# Patient Record
Sex: FEMALE | Race: WHITE | Employment: OTHER | URBAN - METROPOLITAN AREA
[De-identification: names, ages, dates, MRNs, and addresses within clinical notes are randomized per-mention and may not be internally consistent; named-entity substitution may affect disease eponyms.]

---

## 2017-04-19 ENCOUNTER — HOSPITAL ENCOUNTER (OUTPATIENT)
Dept: GENERAL RADIOLOGY | Facility: HOSPITAL | Age: 47
Discharge: HOME | End: 2017-04-19
Attending: INTERNAL MEDICINE
Payer: COMMERCIAL

## 2017-04-19 ENCOUNTER — HOSPITAL ENCOUNTER (OUTPATIENT)
Dept: CT IMAGING | Facility: HOSPITAL | Age: 47
Discharge: HOME | End: 2017-04-19
Attending: INTERNAL MEDICINE
Payer: COMMERCIAL

## 2017-04-19 DIAGNOSIS — M54.50 ACUTE MIDLINE LOW BACK PAIN WITHOUT SCIATICA: ICD-10-CM

## 2017-04-19 DIAGNOSIS — R10.30 LOWER ABDOMINAL PAIN: ICD-10-CM

## 2017-04-19 PROCEDURE — 74176 CT ABD & PELVIS W/O CONTRAST: CPT

## 2017-04-19 PROCEDURE — 72100 X-RAY EXAM L-S SPINE 2/3 VWS: CPT

## 2017-05-30 PROBLEM — R10.9 FLANK PAIN: Status: ACTIVE | Noted: 2017-05-30

## 2017-06-15 ENCOUNTER — HOSPITAL ENCOUNTER (OUTPATIENT)
Dept: MRI IMAGING | Facility: HOSPITAL | Age: 47
Discharge: HOME | End: 2017-06-15
Attending: ORTHOPAEDIC SURGERY
Payer: COMMERCIAL

## 2017-06-15 DIAGNOSIS — R10.9 FLANK PAIN: ICD-10-CM

## 2017-06-15 PROCEDURE — 72146 MRI CHEST SPINE W/O DYE: CPT | Performed by: ORTHOPAEDIC SURGERY

## 2017-06-16 ENCOUNTER — APPOINTMENT (OUTPATIENT)
Dept: NUCLEAR MEDICINE | Facility: HOSPITAL | Age: 47
End: 2017-06-16
Attending: ORTHOPAEDIC SURGERY
Payer: COMMERCIAL

## 2017-06-21 ENCOUNTER — HOSPITAL ENCOUNTER (OUTPATIENT)
Dept: NUCLEAR MEDICINE | Facility: HOSPITAL | Age: 47
Discharge: HOME | End: 2017-06-21
Attending: ORTHOPAEDIC SURGERY
Payer: COMMERCIAL

## 2017-06-21 DIAGNOSIS — R10.9 FLANK PAIN: ICD-10-CM

## 2017-06-21 PROCEDURE — 78315 BONE IMAGING 3 PHASE: CPT | Performed by: ORTHOPAEDIC SURGERY

## 2017-06-26 PROBLEM — M54.6 THORACIC SPINE PAIN: Status: ACTIVE | Noted: 2017-06-26

## 2017-06-27 PROBLEM — R07.81 RIB PAIN ON LEFT SIDE: Status: ACTIVE | Noted: 2017-06-27

## 2017-08-01 PROBLEM — Z53.29 NO-SHOW FOR APPOINTMENT: Status: ACTIVE | Noted: 2017-08-01

## 2017-08-01 PROBLEM — Z91.199 NO-SHOW FOR APPOINTMENT: Status: ACTIVE | Noted: 2017-08-01

## 2018-04-03 PROBLEM — M75.42 IMPINGEMENT SYNDROME OF LEFT SHOULDER: Status: ACTIVE | Noted: 2018-04-03

## 2018-04-03 PROBLEM — M19.019 ARTHRITIS OF SHOULDER REGION: Status: ACTIVE | Noted: 2018-04-03

## 2018-04-03 PROBLEM — M25.512 ACUTE PAIN OF LEFT SHOULDER: Status: ACTIVE | Noted: 2018-04-03

## 2018-04-03 PROCEDURE — 86618 LYME DISEASE ANTIBODY: CPT | Performed by: OTHER

## 2018-04-03 PROCEDURE — 82746 ASSAY OF FOLIC ACID SERUM: CPT | Performed by: OTHER

## 2018-04-03 PROCEDURE — 82607 VITAMIN B-12: CPT | Performed by: OTHER

## 2018-05-03 PROBLEM — M19.012 PRIMARY OSTEOARTHRITIS OF LEFT SHOULDER: Status: ACTIVE | Noted: 2018-05-03

## 2018-05-03 PROBLEM — M23.91 ACUTE INTERNAL DERANGEMENT OF RIGHT KNEE: Status: ACTIVE | Noted: 2018-05-03

## 2018-05-15 ENCOUNTER — HOSPITAL ENCOUNTER (OUTPATIENT)
Dept: MRI IMAGING | Facility: HOSPITAL | Age: 48
Discharge: HOME | End: 2018-05-15
Attending: ORTHOPAEDIC SURGERY
Payer: COMMERCIAL

## 2018-05-15 ENCOUNTER — HOSPITAL ENCOUNTER (OUTPATIENT)
Dept: GENERAL RADIOLOGY | Facility: HOSPITAL | Age: 48
Discharge: HOME | End: 2018-05-15
Attending: ORTHOPAEDIC SURGERY
Payer: COMMERCIAL

## 2018-05-15 DIAGNOSIS — M19.012 PRIMARY OSTEOARTHRITIS OF LEFT SHOULDER: ICD-10-CM

## 2018-05-15 DIAGNOSIS — M25.512 ACUTE PAIN OF LEFT SHOULDER: ICD-10-CM

## 2018-05-15 DIAGNOSIS — M75.42 IMPINGEMENT SYNDROME OF LEFT SHOULDER: ICD-10-CM

## 2018-05-15 PROCEDURE — 73222 MRI JOINT UPR EXTREM W/DYE: CPT | Performed by: ORTHOPAEDIC SURGERY

## 2018-05-15 PROCEDURE — 73040 CONTRAST X-RAY OF SHOULDER: CPT | Performed by: ORTHOPAEDIC SURGERY

## 2018-05-15 PROCEDURE — A9576 INJ PROHANCE MULTIPACK: HCPCS | Performed by: ORTHOPAEDIC SURGERY

## 2018-05-15 PROCEDURE — 23350 INJECTION FOR SHOULDER X-RAY: CPT | Performed by: ORTHOPAEDIC SURGERY

## 2018-05-15 RX ORDER — LIDOCAINE HYDROCHLORIDE 20 MG/ML
INJECTION, SOLUTION EPIDURAL; INFILTRATION; INTRACAUDAL; PERINEURAL
Status: COMPLETED
Start: 2018-05-15 | End: 2018-05-15

## 2018-05-15 RX ORDER — LIDOCAINE HYDROCHLORIDE 20 MG/ML
10 INJECTION, SOLUTION EPIDURAL; INFILTRATION; INTRACAUDAL; PERINEURAL ONCE
Status: COMPLETED | OUTPATIENT
Start: 2018-05-15 | End: 2018-05-15

## 2018-05-15 RX ADMIN — LIDOCAINE HYDROCHLORIDE 10 ML: 20 INJECTION, SOLUTION EPIDURAL; INFILTRATION; INTRACAUDAL; PERINEURAL at 10:15:00

## 2018-05-24 PROBLEM — S43.432A SUPERIOR GLENOID LABRUM LESION OF LEFT SHOULDER: Status: ACTIVE | Noted: 2018-05-24

## 2018-11-08 PROBLEM — M17.11 PRIMARY OSTEOARTHRITIS OF RIGHT KNEE: Status: ACTIVE | Noted: 2018-11-08

## 2019-04-29 ENCOUNTER — TELEPHONE (OUTPATIENT)
Dept: PHYSICAL THERAPY | Facility: HOSPITAL | Age: 49
End: 2019-04-29

## 2019-05-10 ENCOUNTER — HOSPITAL ENCOUNTER (OUTPATIENT)
Dept: BONE DENSITY | Facility: HOSPITAL | Age: 49
Discharge: HOME | End: 2019-05-10
Attending: INTERNAL MEDICINE
Payer: COMMERCIAL

## 2019-05-10 DIAGNOSIS — M19.90 ARTHRITIS: ICD-10-CM

## 2019-05-10 PROCEDURE — 77080 DXA BONE DENSITY AXIAL: CPT | Performed by: INTERNAL MEDICINE

## 2019-06-24 PROBLEM — M79.18 RIGHT BUTTOCK PAIN: Status: ACTIVE | Noted: 2019-06-24

## 2019-06-24 PROBLEM — M54.41 ACUTE RIGHT-SIDED LOW BACK PAIN WITH RIGHT-SIDED SCIATICA: Status: ACTIVE | Noted: 2019-06-24

## 2019-08-09 ENCOUNTER — HOSPITAL ENCOUNTER (OUTPATIENT)
Dept: CT IMAGING | Age: 49
Discharge: HOME | End: 2019-08-09
Attending: INTERNAL MEDICINE

## 2019-08-09 DIAGNOSIS — E78.01 FAMILIAL HYPERCHOLESTEROLEMIA: ICD-10-CM

## 2019-08-09 NOTE — PROGRESS NOTES
Pt seen at [facility] for [facility]:    PRELIMINARY SCORE= 0.0    BP= 90/64    Cholestec labs as follows: Fasting    TC= 237    HDL= 55    LDL= 148    TG= 173    GLUCOSE= 98    All results and risk factors discussed with patient; all questions and concer

## 2019-08-15 PROBLEM — R10.2 PELVIC PAIN: Status: ACTIVE | Noted: 2019-08-15

## 2019-10-07 ENCOUNTER — APPOINTMENT (OUTPATIENT)
Dept: CT IMAGING | Facility: HOSPITAL | Age: 49
End: 2019-10-07
Payer: COMMERCIAL

## 2019-10-07 ENCOUNTER — HOSPITAL ENCOUNTER (EMERGENCY)
Facility: HOSPITAL | Age: 49
Discharge: HOME | End: 2019-10-07
Attending: EMERGENCY MEDICINE
Payer: COMMERCIAL

## 2019-10-07 ENCOUNTER — APPOINTMENT (OUTPATIENT)
Dept: GENERAL RADIOLOGY | Facility: HOSPITAL | Age: 49
End: 2019-10-07
Attending: EMERGENCY MEDICINE
Payer: COMMERCIAL

## 2019-10-07 VITALS
OXYGEN SATURATION: 100 % | HEART RATE: 69 BPM | BODY MASS INDEX: 19.63 KG/M2 | DIASTOLIC BLOOD PRESSURE: 59 MMHG | RESPIRATION RATE: 17 BRPM | TEMPERATURE: 98 F | SYSTOLIC BLOOD PRESSURE: 91 MMHG | WEIGHT: 100 LBS | HEIGHT: 60 IN

## 2019-10-07 DIAGNOSIS — S06.0X1A CONCUSSION WITH LOSS OF CONSCIOUSNESS OF 30 MINUTES OR LESS, INITIAL ENCOUNTER: Primary | ICD-10-CM

## 2019-10-07 PROCEDURE — 81025 URINE PREGNANCY TEST: CPT

## 2019-10-07 PROCEDURE — 96375 TX/PRO/DX INJ NEW DRUG ADDON: CPT

## 2019-10-07 PROCEDURE — 72040 X-RAY EXAM NECK SPINE 2-3 VW: CPT | Performed by: EMERGENCY MEDICINE

## 2019-10-07 PROCEDURE — 81003 URINALYSIS AUTO W/O SCOPE: CPT | Performed by: EMERGENCY MEDICINE

## 2019-10-07 PROCEDURE — 96374 THER/PROPH/DIAG INJ IV PUSH: CPT

## 2019-10-07 PROCEDURE — 93005 ELECTROCARDIOGRAM TRACING: CPT

## 2019-10-07 PROCEDURE — 80048 BASIC METABOLIC PNL TOTAL CA: CPT | Performed by: EMERGENCY MEDICINE

## 2019-10-07 PROCEDURE — 80048 BASIC METABOLIC PNL TOTAL CA: CPT

## 2019-10-07 PROCEDURE — 99285 EMERGENCY DEPT VISIT HI MDM: CPT

## 2019-10-07 PROCEDURE — 96376 TX/PRO/DX INJ SAME DRUG ADON: CPT

## 2019-10-07 PROCEDURE — 93010 ELECTROCARDIOGRAM REPORT: CPT | Performed by: EMERGENCY MEDICINE

## 2019-10-07 PROCEDURE — 85025 COMPLETE CBC W/AUTO DIFF WBC: CPT | Performed by: EMERGENCY MEDICINE

## 2019-10-07 PROCEDURE — 70450 CT HEAD/BRAIN W/O DYE: CPT | Performed by: EMERGENCY MEDICINE

## 2019-10-07 PROCEDURE — 96361 HYDRATE IV INFUSION ADD-ON: CPT

## 2019-10-07 RX ORDER — ACETAMINOPHEN 500 MG
1000 TABLET ORAL ONCE
Status: DISCONTINUED | OUTPATIENT
Start: 2019-10-07 | End: 2019-10-07

## 2019-10-07 RX ORDER — KETOROLAC TROMETHAMINE 15 MG/ML
15 INJECTION, SOLUTION INTRAMUSCULAR; INTRAVENOUS ONCE
Status: COMPLETED | OUTPATIENT
Start: 2019-10-07 | End: 2019-10-07

## 2019-10-07 RX ORDER — ONDANSETRON 2 MG/ML
INJECTION INTRAMUSCULAR; INTRAVENOUS
Status: COMPLETED
Start: 2019-10-07 | End: 2019-10-07

## 2019-10-07 RX ORDER — ONDANSETRON 2 MG/ML
4 INJECTION INTRAMUSCULAR; INTRAVENOUS ONCE
Status: COMPLETED | OUTPATIENT
Start: 2019-10-07 | End: 2019-10-07

## 2019-10-07 NOTE — ED PROVIDER NOTES
Patient Seen in: [facility] Emergency Department      History   Patient presents with:  Fall (musculoskeletal, neurologic)    Stated Complaint: FELL + LOC    HPI    Patient is a 57-year-old female who presents by EMS status post trip and fall that URINALYSIS WITH CULTURE REFLEX   CBC WITH DIFFERENTIAL WITH PLATELET    Narrative: The following orders were created for panel order CBC WITH DIFFERENTIAL WITH PLATELET.   Procedure                               Abnormality         Status not including time spent performing procedures. PHYSICIAN NOTE:  Patient given IV fluids. States her blood pressure is normally on the low side. She received Zofran and adamantly declines any oral fluids.   Dr. Lamont Quan at bedside and we both emphasized

## 2019-10-07 NOTE — ED NOTES
Patient provided with discharge instruction . Verbalized understanding for plan of care at home and follow up. All questions/concerns addressed prior to discharge, no apparent sign of distress, ambulate to wheelchair,help to awaiting car with son.

## 2019-10-07 NOTE — ED NOTES
Patient alert & oriented X 4 speaking in clear complete sentence stated she fell and hit the back of head. No swelling or bleeding on head no apparent sign of distress . Will continue to monitor.

## 2019-10-07 NOTE — PROGRESS NOTES
[facility] - [facility]    Progress Note    Alta Segura Patient Status:  Emergency    1970 MRN [id number]   Location [address] Attending Richard Knapp MD   Hosp Day # 0 PCP MD Kenneth Soni Effort normal and breath sounds normal. No respiratory distress. She has no wheezes. She has no rales. Abdominal: Soft. Bowel sounds are normal. She exhibits no distension and no mass. There is no tenderness. Musculoskeletal: Normal range of motion.  Sh
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
## 2020-02-20 ENCOUNTER — APPOINTMENT (OUTPATIENT)
Dept: CT IMAGING | Facility: HOSPITAL | Age: 50
End: 2020-02-20
Payer: COMMERCIAL

## 2020-02-20 ENCOUNTER — HOSPITAL ENCOUNTER (EMERGENCY)
Facility: HOSPITAL | Age: 50
Discharge: HOME | End: 2020-02-20
Payer: COMMERCIAL

## 2020-02-20 VITALS
HEIGHT: 60 IN | SYSTOLIC BLOOD PRESSURE: 105 MMHG | BODY MASS INDEX: 19.63 KG/M2 | TEMPERATURE: 98 F | OXYGEN SATURATION: 100 % | DIASTOLIC BLOOD PRESSURE: 71 MMHG | RESPIRATION RATE: 18 BRPM | WEIGHT: 100 LBS | HEART RATE: 65 BPM
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
## 2020-02-20 PROCEDURE — 99284 EMERGENCY DEPT VISIT MOD MDM: CPT

## 2020-02-20 PROCEDURE — 70450 CT HEAD/BRAIN W/O DYE: CPT

## 2020-02-20 PROCEDURE — 72125 CT NECK SPINE W/O DYE: CPT

## 2020-02-20 RX ORDER — IBUPROFEN 600 MG/1
600 TABLET ORAL ONCE
Status: COMPLETED | OUTPATIENT
Start: 2020-02-20 | End: 2020-02-20

## 2020-02-20 RX ORDER — CYCLOBENZAPRINE HCL 10 MG
10 TABLET ORAL 3 TIMES DAILY PRN
Qty: 20 TABLET | Refills: 0 | Status: SHIPPED | OUTPATIENT
Start: 2020-02-20 | End: 2020-02-27

## 2020-02-20 RX ORDER — CYCLOBENZAPRINE HCL 10 MG
10 TABLET ORAL ONCE
Status: COMPLETED | OUTPATIENT
Start: 2020-02-20 | End: 2020-02-20

## 2020-02-20 RX ORDER — IBUPROFEN 600 MG/1
600 TABLET ORAL EVERY 8 HOURS PRN
Qty: 30 TABLET | Refills: 0 | Status: SHIPPED | OUTPATIENT
Start: 2020-02-20 | End: 2020-02-27
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
## 2020-03-24 PROBLEM — S06.0X0D CONCUSSION WITHOUT LOSS OF CONSCIOUSNESS, SUBSEQUENT ENCOUNTER: Status: ACTIVE | Noted: 2020-03-24

## 2020-04-21 PROBLEM — M54.2 ACUTE NECK PAIN: Status: ACTIVE | Noted: 2020-04-21

## 2021-08-03 ENCOUNTER — OFFICE VISIT (OUTPATIENT)
Dept: FAMILY MEDICINE CLINIC | Facility: CLINIC | Age: 51
End: 2021-08-03
Payer: MEDICAID

## 2021-08-03 VITALS
WEIGHT: 103 LBS | SYSTOLIC BLOOD PRESSURE: 109 MMHG | RESPIRATION RATE: 15 BRPM | BODY MASS INDEX: 20.22 KG/M2 | HEIGHT: 60 IN | DIASTOLIC BLOOD PRESSURE: 71 MMHG | HEART RATE: 73 BPM

## 2021-08-03 DIAGNOSIS — M25.511 CHRONIC RIGHT SHOULDER PAIN: Primary | ICD-10-CM

## 2021-08-03 DIAGNOSIS — M54.89 OTHER BACK PAIN, UNSPECIFIED CHRONICITY: ICD-10-CM

## 2021-08-03 DIAGNOSIS — Z00.00 ANNUAL PHYSICAL EXAM: ICD-10-CM

## 2021-08-03 DIAGNOSIS — G89.29 CHRONIC RIGHT SHOULDER PAIN: Primary | ICD-10-CM

## 2021-08-03 DIAGNOSIS — R51.9 NONINTRACTABLE EPISODIC HEADACHE, UNSPECIFIED HEADACHE TYPE: ICD-10-CM

## 2021-08-03 PROCEDURE — 3008F BODY MASS INDEX DOCD: CPT | Performed by: FAMILY MEDICINE

## 2021-08-03 PROCEDURE — 3074F SYST BP LT 130 MM HG: CPT | Performed by: FAMILY MEDICINE

## 2021-08-03 PROCEDURE — 3078F DIAST BP <80 MM HG: CPT | Performed by: FAMILY MEDICINE

## 2021-08-03 PROCEDURE — 99204 OFFICE O/P NEW MOD 45 MIN: CPT | Performed by: FAMILY MEDICINE

## 2021-08-03 RX ORDER — DULOXETIN HYDROCHLORIDE 20 MG/1
CAPSULE, DELAYED RELEASE ORAL
Qty: 60 CAPSULE | Refills: 1 | Status: SHIPPED | OUTPATIENT
Start: 2021-08-03 | End: 2021-12-08
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
## 2021-08-31 ENCOUNTER — LAB ENCOUNTER (OUTPATIENT)
Dept: LAB | Age: 51
End: 2021-08-31
Attending: PHYSICAL MEDICINE & REHABILITATION
Payer: MEDICAID

## 2021-08-31 ENCOUNTER — HOSPITAL ENCOUNTER (OUTPATIENT)
Dept: GENERAL RADIOLOGY | Age: 51
Discharge: HOME | End: 2021-08-31
Attending: PHYSICAL MEDICINE & REHABILITATION
Payer: MEDICAID

## 2021-08-31 ENCOUNTER — OFFICE VISIT (OUTPATIENT)
Dept: NEUROLOGY | Facility: CLINIC | Age: 51
End: 2021-08-31
Payer: MEDICAID

## 2021-08-31 ENCOUNTER — TELEPHONE (OUTPATIENT)
Dept: NEUROLOGY | Facility: CLINIC | Age: 51
End: 2021-08-31

## 2021-08-31 VITALS
OXYGEN SATURATION: 99 % | BODY MASS INDEX: 19.24 KG/M2 | WEIGHT: 98 LBS | HEART RATE: 72 BPM | HEIGHT: 60 IN | SYSTOLIC BLOOD PRESSURE: 110 MMHG | DIASTOLIC BLOOD PRESSURE: 60 MMHG

## 2021-08-31 DIAGNOSIS — G89.29 CHRONIC RIGHT SHOULDER PAIN: Primary | ICD-10-CM

## 2021-08-31 DIAGNOSIS — M67.919 TENDINOPATHY OF ROTATOR CUFF, UNSPECIFIED LATERALITY: ICD-10-CM

## 2021-08-31 DIAGNOSIS — M75.41 SUBACROMIAL IMPINGEMENT OF RIGHT SHOULDER: ICD-10-CM

## 2021-08-31 DIAGNOSIS — M25.511 CHRONIC RIGHT SHOULDER PAIN: Primary | ICD-10-CM

## 2021-08-31 DIAGNOSIS — M75.01 ADHESIVE CAPSULITIS OF RIGHT SHOULDER: ICD-10-CM

## 2021-08-31 DIAGNOSIS — R20.0 NUMBNESS: ICD-10-CM

## 2021-08-31 DIAGNOSIS — M75.51 SUBACROMIAL BURSITIS OF RIGHT SHOULDER JOINT: ICD-10-CM

## 2021-08-31 DIAGNOSIS — G89.29 CHRONIC RIGHT SHOULDER PAIN: ICD-10-CM

## 2021-08-31 DIAGNOSIS — M25.511 CHRONIC RIGHT SHOULDER PAIN: ICD-10-CM

## 2021-08-31 LAB
ALBUMIN SERPL-MCNC: 3.7 G/DL (ref 3.4–5)
ALBUMIN/GLOB SERPL: 1.1 {RATIO} (ref 1–2)
ALP LIVER SERPL-CCNC: 53 U/L
ALT SERPL-CCNC: 20 U/L
ANION GAP SERPL CALC-SCNC: 8 MMOL/L (ref 0–18)
AST SERPL-CCNC: 17 U/L (ref 15–37)
BASOPHILS # BLD AUTO: 0.03 X10(3) UL (ref 0–0.2)
BASOPHILS NFR BLD AUTO: 0.7 %
BILIRUB SERPL-MCNC: 0.6 MG/DL (ref 0.1–2)
BUN BLD-MCNC: 16 MG/DL (ref 7–18)
BUN/CREAT SERPL: 19 (ref 10–20)
CALCIUM BLD-MCNC: 9 MG/DL (ref 8.5–10.1)
CHLORIDE SERPL-SCNC: 109 MMOL/L (ref 98–112)
CHOLEST SMN-MCNC: 215 MG/DL (ref ?–200)
CO2 SERPL-SCNC: 24 MMOL/L (ref 21–32)
CREAT BLD-MCNC: 0.84 MG/DL
DEPRECATED RDW RBC AUTO: 40.5 FL (ref 35.1–46.3)
EOSINOPHIL # BLD AUTO: 0.08 X10(3) UL (ref 0–0.7)
EOSINOPHIL NFR BLD AUTO: 1.9 %
ERYTHROCYTE [DISTWIDTH] IN BLOOD BY AUTOMATED COUNT: 12.3 % (ref 11–15)
GLOBULIN PLAS-MCNC: 3.4 G/DL (ref 2.8–4.4)
GLUCOSE BLD-MCNC: 86 MG/DL (ref 70–99)
HCT VFR BLD AUTO: 46.3 %
HDLC SERPL-MCNC: 64 MG/DL (ref 40–59)
HGB BLD-MCNC: 14.6 G/DL
IMM GRANULOCYTES # BLD AUTO: 0.01 X10(3) UL (ref 0–1)
IMM GRANULOCYTES NFR BLD: 0.2 %
LDLC SERPL CALC-MCNC: 140 MG/DL (ref ?–100)
LYMPHOCYTES # BLD AUTO: 1.45 X10(3) UL (ref 1–4)
LYMPHOCYTES NFR BLD AUTO: 34.4 %
M PROTEIN MFR SERPL ELPH: 7.1 G/DL (ref 6.4–8.2)
MCH RBC QN AUTO: 28 PG (ref 26–34)
MCHC RBC AUTO-ENTMCNC: 31.5 G/DL (ref 31–37)
MCV RBC AUTO: 88.7 FL
MONOCYTES # BLD AUTO: 0.43 X10(3) UL (ref 0.1–1)
MONOCYTES NFR BLD AUTO: 10.2 %
NEUTROPHILS # BLD AUTO: 2.21 X10 (3) UL (ref 1.5–7.7)
NEUTROPHILS # BLD AUTO: 2.21 X10(3) UL (ref 1.5–7.7)
NEUTROPHILS NFR BLD AUTO: 52.6 %
NONHDLC SERPL-MCNC: 151 MG/DL (ref ?–130)
OSMOLALITY SERPL CALC.SUM OF ELEC: 292 MOSM/KG (ref 275–295)
PATIENT FASTING Y/N/NP: YES
PATIENT FASTING Y/N/NP: YES
PLATELET # BLD AUTO: 242 10(3)UL (ref 150–450)
POTASSIUM SERPL-SCNC: 5 MMOL/L (ref 3.5–5.1)
RBC # BLD AUTO: 5.22 X10(6)UL
RHEUMATOID FACT SERPL-ACNC: <10 IU/ML (ref ?–15)
SODIUM SERPL-SCNC: 141 MMOL/L (ref 136–145)
TRIGL SERPL-MCNC: 65 MG/DL (ref 30–149)
TSI SER-ACNC: 1.65 MIU/ML (ref 0.36–3.74)
VLDLC SERPL CALC-MCNC: 12 MG/DL (ref 0–30)
WBC # BLD AUTO: 4.2 X10(3) UL (ref 4–11)

## 2021-08-31 PROCEDURE — 3074F SYST BP LT 130 MM HG: CPT | Performed by: PHYSICAL MEDICINE & REHABILITATION

## 2021-08-31 PROCEDURE — 3008F BODY MASS INDEX DOCD: CPT | Performed by: PHYSICAL MEDICINE & REHABILITATION

## 2021-08-31 PROCEDURE — 3078F DIAST BP <80 MM HG: CPT | Performed by: PHYSICAL MEDICINE & REHABILITATION

## 2021-08-31 PROCEDURE — 80053 COMPREHEN METABOLIC PANEL: CPT | Performed by: FAMILY MEDICINE

## 2021-08-31 PROCEDURE — 80061 LIPID PANEL: CPT | Performed by: FAMILY MEDICINE

## 2021-08-31 PROCEDURE — 73030 X-RAY EXAM OF SHOULDER: CPT | Performed by: PHYSICAL MEDICINE & REHABILITATION

## 2021-08-31 PROCEDURE — 84443 ASSAY THYROID STIM HORMONE: CPT | Performed by: FAMILY MEDICINE

## 2021-08-31 PROCEDURE — 86431 RHEUMATOID FACTOR QUANT: CPT | Performed by: FAMILY MEDICINE

## 2021-08-31 PROCEDURE — 85025 COMPLETE CBC W/AUTO DIFF WBC: CPT | Performed by: FAMILY MEDICINE

## 2021-08-31 PROCEDURE — 20610 DRAIN/INJ JOINT/BURSA W/O US: CPT | Performed by: PHYSICAL MEDICINE & REHABILITATION

## 2021-08-31 PROCEDURE — 36415 COLL VENOUS BLD VENIPUNCTURE: CPT | Performed by: FAMILY MEDICINE

## 2021-08-31 PROCEDURE — 99244 OFF/OP CNSLTJ NEW/EST MOD 40: CPT | Performed by: PHYSICAL MEDICINE & REHABILITATION

## 2021-08-31 RX ORDER — TRIAMCINOLONE ACETONIDE 40 MG/ML
40 INJECTION, SUSPENSION INTRA-ARTICULAR; INTRAMUSCULAR ONCE
Status: COMPLETED | OUTPATIENT
Start: 2021-08-31 | End: 2021-08-31

## 2021-08-31 RX ORDER — LIDOCAINE HYDROCHLORIDE 10 MG/ML
7 INJECTION, SOLUTION INFILTRATION; PERINEURAL ONCE
Status: COMPLETED | OUTPATIENT
Start: 2021-08-31 | End: 2021-08-31
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
## 2021-08-31 NOTE — PATIENT INSTRUCTIONS
1) Take Naprosyn 500 mg 1 tablet twice per day with food for the next two weeks and then as needed but no more than 2 tablets per day. Do not take with any other NSAIDS (Ibuprofen, Advil, Aleve, etc).  OK to take Tylenol 500 mg every 6 hours as needed for p Burt

## 2021-08-31 NOTE — TELEPHONE ENCOUNTER
Arpan Ocampo at [facility] Case/Reference # [id number] to initiate authorization for Right subacromial csi CPT 91495+.   Coverage is active    Status: Approved-authorization is not required for participating providers    Dr. Julianne Oliva notified of

## 2021-08-31 NOTE — PROCEDURES
[address]   Shoulder Injection Procedure Note    CHIEF COMPLAINT:  Patient presents with:  New Patient: New right handed patient is here for right shoulder pain. Denies injury.  States she woke up one day in (calc) Final   • ALBUMIN/GLOBULIN RATIO 11/21/2019 1.9  1.0 - 2.5 (calc) Final   • BILIRUBIN, TOTAL 11/21/2019 0.7  0.2 - 1.2 mg/dL Final   • ALKALINE PHOSPHATASE 11/21/2019 73  33 - 130 U/L Final   • AST 11/21/2019 23  10 - 35 U/L Final   • ALT 11/21/2019 Final   • BASOPHILS 11/21/2019 0.5  % Final   • BLASTS 11/21/2019 CANCELED  % Final   • NUCLEATED RBC 11/21/2019 CANCELED  0 /100 WBC Final   • COMMENT(S) 11/21/2019 CANCELED   Final   • TSH 11/21/2019 1.58  mIU/L Final   • CHOLESTEROL, TOTAL 11/21/2019 23 INSTRUCTIONS GIVEN TO PATIENT:    \"You will see an effect in the next 2-3 days.   Please contact me if you have fevers, worsening swelling, worsening pain, decreased range of motion, increased redness, chills, or anything that makes you concerned abo

## 2021-09-02 ENCOUNTER — TELEPHONE (OUTPATIENT)
Dept: PHYSICAL THERAPY | Facility: HOSPITAL | Age: 51
End: 2021-09-02

## 2021-09-02 ENCOUNTER — APPOINTMENT (OUTPATIENT)
Dept: PHYSICAL THERAPY | Facility: HOSPITAL | Age: 51
End: 2021-09-02
Attending: FAMILY MEDICINE
Payer: MEDICAID

## 2021-09-10 ENCOUNTER — OFFICE VISIT (OUTPATIENT)
Dept: PHYSICAL THERAPY | Facility: HOSPITAL | Age: 51
End: 2021-09-10
Attending: FAMILY MEDICINE
Payer: MEDICAID

## 2021-09-10 DIAGNOSIS — M25.511 CHRONIC RIGHT SHOULDER PAIN: ICD-10-CM

## 2021-09-10 DIAGNOSIS — G89.29 CHRONIC RIGHT SHOULDER PAIN: ICD-10-CM

## 2021-09-10 PROCEDURE — 97162 PT EVAL MOD COMPLEX 30 MIN: CPT

## 2021-09-10 PROCEDURE — 97112 NEUROMUSCULAR REEDUCATION: CPT

## 2021-09-10 NOTE — PROGRESS NOTES
PHYSICAL THERAPY EVALUATION:   Referring Physician: Dr. Jabari Madden  Diagnosis: right shoulder pain     Date of Service: 9/10/2021     PATIENT SUMMARY   Precautions: standard    Past medical history: reviewed via epic electronic medical records and confirmed monica. Medical Screening Red Flags:  The patient denied experiencing all of the following symptoms except those marked YES in the past month  Fever/chills/sweats  Repeated infections  Recent weight gain/loss  Nausea/vomiting  Chest pains/heart palpita Supraspinatus: R 3+/5  L 5/5  ER at side: R 4/5; L 5/5  IR at side: R 4/5; L 5/5       ASSESSMENT:    Chanel presents to physical therapy with chronic right shoulder pain with signs and symptoms consistent with adhesive capsulitis with a capsular pattern of 6-8 hours/night. • The patient will demonstrate daily stress management techniques with use of exercise and meditation/diaphragmatic breathing/mindfullness to improve stress levels and mental health and well being.   • The patient will restore right should certification required: Yes  I certify the need for these services furnished under this plan of treatment and while under my care.     X___________________________________________________ Date____________________    Certification From: 7/18/6606  To:12/9/20

## 2021-09-13 ENCOUNTER — OFFICE VISIT (OUTPATIENT)
Dept: PHYSICAL THERAPY | Facility: HOSPITAL | Age: 51
End: 2021-09-13
Attending: FAMILY MEDICINE
Payer: MEDICAID

## 2021-09-13 PROCEDURE — 97110 THERAPEUTIC EXERCISES: CPT

## 2021-09-13 NOTE — PROGRESS NOTES
PHYSICAL THERAPY DAILY TREATMENT NOTE  Precautions: standard/universal  Fall risk: standard  Date of Evaluation: 9/10/21  Diagnosis: right shoulder pain     Insurance Type (# Auth): [facility] medicaid (12 visits)  Visit #: 2     Progress note due on: visit # management with heat and use of prescribed naproxen. Trevon Gage will continue to benefit from skilled physical therapy interventions to improve function and achieve all established physical therapy goals.     Plan for next visit: try taping shoulder for patient will be able to return to swimming without pain or limitations. · The patient will be independent with a HEP for continued management and progression in the future.          Total Treatment time: 30 minutes  Total Timed Treatment Time: 30 minutes

## 2021-09-16 ENCOUNTER — APPOINTMENT (OUTPATIENT)
Dept: PHYSICAL THERAPY | Facility: HOSPITAL | Age: 51
End: 2021-09-16
Attending: FAMILY MEDICINE
Payer: MEDICAID

## 2021-09-20 ENCOUNTER — OFFICE VISIT (OUTPATIENT)
Dept: PHYSICAL THERAPY | Facility: HOSPITAL | Age: 51
End: 2021-09-20
Attending: FAMILY MEDICINE
Payer: MEDICAID

## 2021-09-20 PROCEDURE — 97140 MANUAL THERAPY 1/> REGIONS: CPT

## 2021-09-20 NOTE — PROGRESS NOTES
PHYSICAL THERAPY DAILY TREATMENT NOTE  Precautions: standard/universal  Fall risk: standard  Date of Evaluation: 9/10/21  Diagnosis: right shoulder pain     Insurance Type (# Auth): [facility] medicaid (12 visits)  Visit #: 3   Progress note due on: visit #12 Times a Day  o PENDULUM FORWARD BACK  -  Duration 60 Seconds, Complete 1 Set, Perform 3 Times a Day  o CANE EXTERNAL ROTATION STRETCH - PILLOW -  Repeat 20 Times, Hold 1 Second(s), Complete 1 Set, Perform 3 Times a Day  o Lumbar flexion with ball 1 -  Repe

## 2021-09-22 ENCOUNTER — APPOINTMENT (OUTPATIENT)
Dept: PHYSICAL THERAPY | Facility: HOSPITAL | Age: 51
End: 2021-09-22
Attending: FAMILY MEDICINE
Payer: MEDICAID

## 2021-09-27 ENCOUNTER — APPOINTMENT (OUTPATIENT)
Dept: PHYSICAL THERAPY | Facility: HOSPITAL | Age: 51
End: 2021-09-27
Attending: FAMILY MEDICINE
Payer: MEDICAID

## 2021-09-28 ENCOUNTER — TELEPHONE (OUTPATIENT)
Dept: PHYSICAL THERAPY | Facility: HOSPITAL | Age: 51
End: 2021-09-28

## 2021-09-29 ENCOUNTER — APPOINTMENT (OUTPATIENT)
Dept: PHYSICAL THERAPY | Facility: HOSPITAL | Age: 51
End: 2021-09-29
Attending: FAMILY MEDICINE
Payer: MEDICAID

## 2021-12-08 ENCOUNTER — OFFICE VISIT (OUTPATIENT)
Dept: PHYSICAL MEDICINE AND REHAB | Facility: CLINIC | Age: 51
End: 2021-12-08
Payer: MEDICAID

## 2021-12-08 ENCOUNTER — TELEPHONE (OUTPATIENT)
Dept: PHYSICAL MEDICINE AND REHAB | Facility: CLINIC | Age: 51
End: 2021-12-08

## 2021-12-08 VITALS
WEIGHT: 98 LBS | BODY MASS INDEX: 19.24 KG/M2 | DIASTOLIC BLOOD PRESSURE: 62 MMHG | SYSTOLIC BLOOD PRESSURE: 102 MMHG | HEIGHT: 60 IN | OXYGEN SATURATION: 99 % | HEART RATE: 76 BPM

## 2021-12-08 DIAGNOSIS — M67.911 TENDINOPATHY OF RIGHT ROTATOR CUFF: ICD-10-CM

## 2021-12-08 DIAGNOSIS — M75.51 SUBACROMIAL BURSITIS OF RIGHT SHOULDER JOINT: ICD-10-CM

## 2021-12-08 DIAGNOSIS — M25.511 CHRONIC RIGHT SHOULDER PAIN: ICD-10-CM

## 2021-12-08 DIAGNOSIS — M75.41 SUBACROMIAL IMPINGEMENT OF RIGHT SHOULDER: Primary | ICD-10-CM

## 2021-12-08 DIAGNOSIS — M75.01 ADHESIVE CAPSULITIS OF RIGHT SHOULDER: ICD-10-CM

## 2021-12-08 DIAGNOSIS — G89.29 CHRONIC RIGHT SHOULDER PAIN: ICD-10-CM

## 2021-12-08 PROCEDURE — 99214 OFFICE O/P EST MOD 30 MIN: CPT | Performed by: PHYSICAL MEDICINE & REHABILITATION

## 2021-12-08 PROCEDURE — 3008F BODY MASS INDEX DOCD: CPT | Performed by: PHYSICAL MEDICINE & REHABILITATION

## 2021-12-08 PROCEDURE — 3074F SYST BP LT 130 MM HG: CPT | Performed by: PHYSICAL MEDICINE & REHABILITATION

## 2021-12-08 PROCEDURE — 3078F DIAST BP <80 MM HG: CPT | Performed by: PHYSICAL MEDICINE & REHABILITATION

## 2021-12-08 NOTE — PROGRESS NOTES
[address]  Progress Note    CHIEF COMPLAINT:  Patient presents with: Injection: LOV: 8/31/21 Patient f/u on R subacromial CSI with 60% improvement.   C/o R shoulder pain that radiates to bicep, denies N/T. Relation Age of Onset   • Diabetes Father    • Other (Other) Father         COPD   • Heart Disorder Mother         CAD and Stroke (CVA)   • Hypertension Mother    • Arthritis Mother    • Cancer Sister         Lymphoma       CURRENT MEDICATIONS:   Current O subacromial and anterior subacromial spaces, right biceps tendon, right subscapularis tendon  ROM: Abduction to 90  degrees, external rotation to 70 degrees, internal rotation to 5 degrees  Strength: 5 out of 5 in all myotomes of the bilateral upper extrem Cholesterol, Total 08/31/2021 215* <200 mg/dL Final   • HDL Cholesterol 08/31/2021 64* 40 - 59 mg/dL Final   • Triglycerides 08/31/2021 65  30 - 149 mg/dL Final   • LDL Cholesterol 08/31/2021 140* <100 mg/dL Final   • VLDL 08/31/2021 12  0 - 30 mg/dL Final TECHNIQUE: 3 views were obtained. FINDINGS:   BONES: Normal. No significant arthropathy, fracture or acute abnormality. SOFT TISSUES: Negative. No visible soft tissue swelling. EFFUSION: None visible. OTHER: Negative.                Impression:

## 2021-12-08 NOTE — PATIENT INSTRUCTIONS
1) My office will call you once the MRI is approved by your insurance. You should then schedule the MRI and call my office again to make an appointment with me 2-3 days after your exam for review of your images and a further plan.  If your MRI is not being Pharmacy Clozapine Initial Note    Patient's Name: John Juárez  Patient's : 1965    Recent ANC Value(s) for last 7 days:  Recent Labs   Lab Test 21  1332 20  0854 20  1230 20  1318 20  1521 03/08/15  1812 14  2018   ANEU 2.6 2.4 2.9 3.2 2.4 6.1 3.7       Is the patient enrolled in the cloZAPine REMS program? Yes  Ordering prescriber: Wilton Morfin MD  Is this provider certified in the cloZAPine REMS program? Yes  Is the ANC within recommended limits? Yes  Does the patient have any signs or symptoms of infection, including fever? No    Plan:  1. Initiate clozapine therapy at 25 mg PO HS.  2. A WBC with differential will be ordered at least weekly, next due 2021. ANC values will be entered into the REMS program.    Nicollette McMann, PharmD  [facility]: Ascom *[phone]

## 2021-12-08 NOTE — TELEPHONE ENCOUNTER
Initiated authorization for Right Shoulder MRI CPT 39432 to be done at [address] [place] [address] with [employer] online  Case #[id number].   [employer] requested clinicals-clinicals faxed to [phone] once note is completed  Status: pending

## 2021-12-08 NOTE — TELEPHONE ENCOUNTER
[facility] [place]   for authorization of approval of RIGHT subacromial CSI under ultrasound guidance + Right glenohumeral CSI under ultrasound guidance  cpt code 63103,  x 2. Talked to [employer]. Authorization is not required.  Referenc

## 2021-12-08 NOTE — TELEPHONE ENCOUNTER
Clinicals faxed to [address] [facility] at [phone] re: right Shoulder MRI

## 2021-12-09 NOTE — TELEPHONE ENCOUNTER
Per Dr. Joe Sandhu note on 12/8/21   Once the MRI is approved by your insurance. You should then schedule the MRI and call my office again to make an appointment with me 2-3 days after your exam for review of your images and a further plan.  If your MRI is not

## 2021-12-13 NOTE — TELEPHONE ENCOUNTER
Received Approval notice from [facility] for right Shoulder MRI with authorization #[id number] valid 12/10/21-6/8/22    Patient notified via [place]

## 2021-12-16 ENCOUNTER — OFFICE VISIT (OUTPATIENT)
Dept: FAMILY MEDICINE CLINIC | Facility: CLINIC | Age: 51
End: 2021-12-16
Payer: MEDICAID

## 2021-12-16 VITALS
HEIGHT: 60 IN | WEIGHT: 99.19 LBS | DIASTOLIC BLOOD PRESSURE: 68 MMHG | BODY MASS INDEX: 19.47 KG/M2 | HEART RATE: 80 BPM | SYSTOLIC BLOOD PRESSURE: 104 MMHG

## 2021-12-16 DIAGNOSIS — S06.0X9A CONCUSSION WITH LOSS OF CONSCIOUSNESS, INITIAL ENCOUNTER: Primary | ICD-10-CM

## 2021-12-16 DIAGNOSIS — H53.9 VISION DISORDER: ICD-10-CM

## 2021-12-16 DIAGNOSIS — Z12.39 ENCOUNTER FOR SCREENING FOR MALIGNANT NEOPLASM OF BREAST, UNSPECIFIED SCREENING MODALITY: ICD-10-CM

## 2021-12-16 PROCEDURE — 3078F DIAST BP <80 MM HG: CPT | Performed by: FAMILY MEDICINE

## 2021-12-16 PROCEDURE — 3074F SYST BP LT 130 MM HG: CPT | Performed by: FAMILY MEDICINE

## 2021-12-16 PROCEDURE — 99214 OFFICE O/P EST MOD 30 MIN: CPT | Performed by: FAMILY MEDICINE

## 2021-12-16 PROCEDURE — 3008F BODY MASS INDEX DOCD: CPT | Performed by: FAMILY MEDICINE
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
## 2021-12-26 ENCOUNTER — HOSPITAL ENCOUNTER (EMERGENCY)
Facility: HOSPITAL | Age: 51
Discharge: HOME | End: 2021-12-27
Attending: EMERGENCY MEDICINE
Payer: MEDICAID

## 2021-12-26 DIAGNOSIS — R10.9 ACUTE LEFT FLANK PAIN: Primary | ICD-10-CM

## 2021-12-26 PROCEDURE — 96372 THER/PROPH/DIAG INJ SC/IM: CPT

## 2021-12-26 PROCEDURE — 99284 EMERGENCY DEPT VISIT MOD MDM: CPT

## 2021-12-27 ENCOUNTER — APPOINTMENT (OUTPATIENT)
Dept: CT IMAGING | Facility: HOSPITAL | Age: 51
End: 2021-12-27
Attending: EMERGENCY MEDICINE
Payer: MEDICAID

## 2021-12-27 VITALS
DIASTOLIC BLOOD PRESSURE: 70 MMHG | RESPIRATION RATE: 16 BRPM | HEIGHT: 60 IN | OXYGEN SATURATION: 98 % | WEIGHT: 97 LBS | BODY MASS INDEX: 19.04 KG/M2 | HEART RATE: 80 BPM | SYSTOLIC BLOOD PRESSURE: 100 MMHG | TEMPERATURE: 99 F

## 2021-12-27 LAB
B-HCG UR QL: NEGATIVE
BILIRUB UR QL: NEGATIVE
COLOR UR: YELLOW
GLUCOSE UR-MCNC: NEGATIVE MG/DL
KETONES UR-MCNC: NEGATIVE MG/DL
LEUKOCYTE ESTERASE UR QL STRIP.AUTO: NEGATIVE
NITRITE UR QL STRIP.AUTO: NEGATIVE
PH UR: 5 [PH] (ref 5–8)
PROT UR-MCNC: NEGATIVE MG/DL
RBC #/AREA URNS AUTO: >10 /HPF
SP GR UR STRIP: 1.02 (ref 1–1.03)
UROBILINOGEN UR STRIP-ACNC: <2

## 2021-12-27 PROCEDURE — 81001 URINALYSIS AUTO W/SCOPE: CPT | Performed by: EMERGENCY MEDICINE

## 2021-12-27 PROCEDURE — 81025 URINE PREGNANCY TEST: CPT

## 2021-12-27 PROCEDURE — 74176 CT ABD & PELVIS W/O CONTRAST: CPT | Performed by: EMERGENCY MEDICINE

## 2021-12-27 RX ORDER — CEPHALEXIN 500 MG/1
500 CAPSULE ORAL 3 TIMES DAILY
Qty: 21 CAPSULE | Refills: 0 | Status: SHIPPED | OUTPATIENT
Start: 2021-12-27 | End: 2022-01-03

## 2021-12-27 RX ORDER — MELOXICAM 15 MG/1
15 TABLET ORAL DAILY
Qty: 15 TABLET | Refills: 0 | Status: SHIPPED | OUTPATIENT
Start: 2021-12-27 | End: 2022-01-11

## 2021-12-27 RX ORDER — ONDANSETRON 4 MG/1
4 TABLET, ORALLY DISINTEGRATING ORAL ONCE
Status: COMPLETED | OUTPATIENT
Start: 2021-12-27 | End: 2021-12-27

## 2021-12-27 RX ORDER — KETOROLAC TROMETHAMINE 30 MG/ML
30 INJECTION, SOLUTION INTRAMUSCULAR; INTRAVENOUS ONCE
Status: COMPLETED | OUTPATIENT
Start: 2021-12-27 | End: 2021-12-27

## 2021-12-27 NOTE — ED PROVIDER NOTES
Patient Seen in: [facility] AND [facility] Emergency Department    History   Patient presents with:  Abdomen/Flank Pain      HPI    Patient presents to the ED complaining of pain in her left flank area starting 4 days ago.   Pain radiates slightly towards the fro Social History and Family History elements reviewed from today, pertinent positives to the presenting problem noted.     Physical Exam     ED Triage Vitals [12/26/21 2342]   /72   Pulse 88   Resp 20   Temp 98.7 °F (37.1 °C)   Temp src Oral   SpO2 while in ED: [address]  [address] Radiology, [facility]  [phone] - Phone    [facility]    NAME: Via GRZEGORZ Estrella    DATE OF EXAM: 12/27/2021  Patient No:  [id number]  Physician:  Nancy Mcfarland  YOB: 1970    Pas concerning for musculoskeletal cause of pain. CT abdomen pelvis obtained without evidence for intra-abdominal abnormalities.   No evidence for urinary tract infection at this time but patient requesting antibiotics given symptoms similar to past infections

## 2021-12-27 NOTE — ED INITIAL ASSESSMENT (HPI)
Pt to ED w/ c/o left abd & flank pain since Thursday. Hx of UTIs & Kidney stones. Pt reports nausea r/t pain.

## 2021-12-27 NOTE — ED QUICK NOTES
Pt prepared for dc home. Expressed a verbal understanding of all dc instructions and when to follow up as needed.

## 2021-12-28 ENCOUNTER — TELEPHONE (OUTPATIENT)
Dept: FAMILY MEDICINE CLINIC | Facility: CLINIC | Age: 51
End: 2021-12-28

## 2021-12-28 NOTE — TELEPHONE ENCOUNTER
Action Requested: Summary for Provider     []  Critical Lab, Recommendations Needed  [] Need Additional Advice  []   FYI    []   Need Orders  [] Need Medications Sent to Pharmacy  []  Other     SUMMARY: video visit scheduled with Dr Andrés Yates for eval.    Pt sta

## 2021-12-29 ENCOUNTER — TELEMEDICINE (OUTPATIENT)
Dept: FAMILY MEDICINE CLINIC | Facility: CLINIC | Age: 51
End: 2021-12-29
Payer: MEDICAID

## 2021-12-29 DIAGNOSIS — R05.9 COUGH: ICD-10-CM

## 2021-12-29 DIAGNOSIS — Z87.440 HISTORY OF UTI: ICD-10-CM

## 2021-12-29 PROCEDURE — 99213 OFFICE O/P EST LOW 20 MIN: CPT | Performed by: FAMILY MEDICINE

## 2021-12-29 NOTE — PROGRESS NOTES
Subjective:   Patient ID: Mecca Valdes is a 46year old female. This visit is conducted using Telemedicine with live, interactive video and audio during this Coronavirus pandemic.     Please note that the following visit was completed using two-way, re Negative for difficulty urinating and dysuria. Flank pain: much better. Musculoskeletal: Positive for myalgias.      Current Outpatient Medications   Medication Sig Dispense Refill   • cephalexin (KEFLEX) 500 MG Oral Cap Take 1 capsule (500 mg total) by m

## 2021-12-31 ENCOUNTER — TELEPHONE (OUTPATIENT)
Dept: FAMILY MEDICINE CLINIC | Facility: CLINIC | Age: 51
End: 2021-12-31

## 2021-12-31 NOTE — TELEPHONE ENCOUNTER
On call note: Was called on 12/31/21 by patient that she just found out she has COVID today. Has had no fevers or sig SOB but has had some sinus drainage and cough. Pt is on keflex for UTI already. No other sig symptoms.  Discussed otc remedies and rest; to

## 2022-01-05 ENCOUNTER — NURSE TRIAGE (OUTPATIENT)
Dept: FAMILY MEDICINE CLINIC | Facility: CLINIC | Age: 52
End: 2022-01-05

## 2022-01-05 RX ORDER — FLUCONAZOLE 150 MG/1
150 TABLET ORAL ONCE
Qty: 1 TABLET | Refills: 0 | Status: SHIPPED | OUTPATIENT
Start: 2022-01-05 | End: 2022-01-05

## 2022-01-05 RX ORDER — AZITHROMYCIN 250 MG/1
TABLET, FILM COATED ORAL
Qty: 6 TABLET | Refills: 0 | Status: SHIPPED | OUTPATIENT
Start: 2022-01-05 | End: 2022-01-10

## 2022-01-05 NOTE — TELEPHONE ENCOUNTER
Dr. Eileen Chacon - does patient need more antibiotic and treatment for yeast infection? See symptoms below. See also 12/29/21 Telemedicine Visit Dr. Bob Geiger  Please reply to pool: EM RN TRIAGE      Patient calling office. RN spoke with patient.  Patient's date of b

## 2022-03-08 RX ORDER — CLONAZEPAM 0.5 MG/1
TABLET ORAL
Qty: 60 TABLET | Refills: 0 | Status: SHIPPED | OUTPATIENT
Start: 2022-03-08

## 2022-03-08 NOTE — TELEPHONE ENCOUNTER
Pt called wanting you to give her a refill for her. Clonazepam 0.5 mg. Per pt she only uses it as needed for her anxiety. Pt had a anxiety attack today and noted that she has no more refills on file. Pt is having a lot of stress at work so she got the anxiety attack today.  Please Advise

## 2022-04-03 ENCOUNTER — HOSPITAL ENCOUNTER (EMERGENCY)
Facility: HOSPITAL | Age: 52
Discharge: HOME | End: 2022-04-04
Attending: EMERGENCY MEDICINE
Payer: MEDICAID

## 2022-04-03 DIAGNOSIS — N39.0 URINARY TRACT INFECTION WITHOUT HEMATURIA, SITE UNSPECIFIED: Primary | ICD-10-CM

## 2022-04-03 PROCEDURE — 87077 CULTURE AEROBIC IDENTIFY: CPT | Performed by: EMERGENCY MEDICINE

## 2022-04-03 PROCEDURE — 99283 EMERGENCY DEPT VISIT LOW MDM: CPT

## 2022-04-03 PROCEDURE — 87186 SC STD MICRODIL/AGAR DIL: CPT | Performed by: EMERGENCY MEDICINE

## 2022-04-03 PROCEDURE — 81001 URINALYSIS AUTO W/SCOPE: CPT | Performed by: EMERGENCY MEDICINE

## 2022-04-03 PROCEDURE — 87086 URINE CULTURE/COLONY COUNT: CPT | Performed by: EMERGENCY MEDICINE

## 2022-04-04 VITALS
BODY MASS INDEX: 19.04 KG/M2 | TEMPERATURE: 97 F | DIASTOLIC BLOOD PRESSURE: 76 MMHG | SYSTOLIC BLOOD PRESSURE: 104 MMHG | WEIGHT: 97 LBS | RESPIRATION RATE: 18 BRPM | OXYGEN SATURATION: 98 % | HEART RATE: 74 BPM | HEIGHT: 60 IN

## 2022-04-04 LAB
BILIRUB UR QL: NEGATIVE
COLOR UR: YELLOW
GLUCOSE UR-MCNC: NEGATIVE MG/DL
HGB UR QL STRIP.AUTO: NEGATIVE
HYALINE CASTS #/AREA URNS AUTO: PRESENT /LPF
KETONES UR-MCNC: NEGATIVE MG/DL
NITRITE UR QL STRIP.AUTO: NEGATIVE
PH UR: 8 [PH] (ref 5–8)
PROT UR-MCNC: NEGATIVE MG/DL
SP GR UR STRIP: 1.02 (ref 1–1.03)
UROBILINOGEN UR STRIP-ACNC: 2
VIT C UR-MCNC: NEGATIVE MG/DL
WBC #/AREA URNS AUTO: >50 /HPF

## 2022-04-04 RX ORDER — FLUCONAZOLE 150 MG/1
150 TABLET ORAL ONCE
Qty: 1 TABLET | Refills: 3 | Status: SHIPPED | OUTPATIENT
Start: 2022-04-04 | End: 2022-04-04

## 2022-04-04 RX ORDER — CIPROFLOXACIN 500 MG/1
500 TABLET, FILM COATED ORAL 2 TIMES DAILY
Qty: 14 TABLET | Refills: 0 | Status: SHIPPED | OUTPATIENT
Start: 2022-04-04 | End: 2022-04-11

## 2022-04-04 RX ORDER — PHENAZOPYRIDINE HYDROCHLORIDE 200 MG/1
200 TABLET, FILM COATED ORAL ONCE
Status: DISCONTINUED | OUTPATIENT
Start: 2022-04-04 | End: 2022-04-04

## 2022-04-04 RX ORDER — CIPROFLOXACIN 500 MG/1
500 TABLET, FILM COATED ORAL ONCE
Status: COMPLETED | OUTPATIENT
Start: 2022-04-04 | End: 2022-04-04

## 2022-04-04 RX ORDER — PHENAZOPYRIDINE HYDROCHLORIDE 200 MG/1
200 TABLET, FILM COATED ORAL 3 TIMES DAILY PRN
Qty: 6 TABLET | Refills: 0 | Status: SHIPPED | OUTPATIENT
Start: 2022-04-04 | End: 2022-04-11

## 2022-04-04 NOTE — ED INITIAL ASSESSMENT (HPI)
Pt to the ed for complaints of right flank pain and dysuria. She is frequently treated for UTIs and sees a urologist. Reports chills, but denies fever, nausea, and vomiting.

## 2022-04-15 ENCOUNTER — OFFICE VISIT (OUTPATIENT)
Dept: FAMILY MEDICINE CLINIC | Facility: CLINIC | Age: 52
End: 2022-04-15
Payer: MEDICAID

## 2022-04-15 VITALS
BODY MASS INDEX: 19.24 KG/M2 | SYSTOLIC BLOOD PRESSURE: 110 MMHG | DIASTOLIC BLOOD PRESSURE: 72 MMHG | WEIGHT: 98 LBS | HEIGHT: 60 IN | HEART RATE: 83 BPM

## 2022-04-15 DIAGNOSIS — M54.59 OTHER LOW BACK PAIN: ICD-10-CM

## 2022-04-15 DIAGNOSIS — R39.89 URINARY PROBLEM: Primary | ICD-10-CM

## 2022-04-15 PROCEDURE — 87086 URINE CULTURE/COLONY COUNT: CPT | Performed by: FAMILY MEDICINE

## 2022-04-15 RX ORDER — PROGESTERONE 100 MG/1
100 CAPSULE ORAL NIGHTLY
Qty: 90 CAPSULE | Refills: 1 | Status: SHIPPED | OUTPATIENT
Start: 2022-04-15

## 2022-04-15 RX ORDER — METHENAMINE, SODIUM PHOSPHATE, MONOBASIC, MONOHYDRATE, PHENYL SALICYLATE, METHYLENE BLUE, AND HYOSCYAMINE SULFATE 120; 40.8; 36; 10; .12 MG/1; MG/1; MG/1; MG/1; MG/1
CAPSULE ORAL
Qty: 120 CAPSULE | Refills: 0 | Status: CANCELLED | OUTPATIENT
Start: 2022-04-15 | End: 2022-05-15

## 2022-04-15 RX ORDER — ESTRADIOL 0.05 MG/D
1 PATCH, EXTENDED RELEASE TRANSDERMAL
Qty: 24 EACH | Refills: 1 | Status: SHIPPED | OUTPATIENT
Start: 2022-04-15

## 2022-04-26 ENCOUNTER — PATIENT MESSAGE (OUTPATIENT)
Dept: ADMINISTRATIVE | Age: 52
End: 2022-04-26

## 2022-04-26 ENCOUNTER — TELEPHONE (OUTPATIENT)
Dept: FAMILY MEDICINE CLINIC | Facility: CLINIC | Age: 52
End: 2022-04-26

## 2022-04-26 ENCOUNTER — TELEMEDICINE (OUTPATIENT)
Dept: FAMILY MEDICINE CLINIC | Facility: CLINIC | Age: 52
End: 2022-04-26
Payer: MEDICAID

## 2022-04-26 DIAGNOSIS — R39.89 URINARY PROBLEM: Primary | ICD-10-CM

## 2022-04-26 DIAGNOSIS — R30.0 DYSURIA: Primary | ICD-10-CM

## 2022-04-26 DIAGNOSIS — N39.0 RECURRENT UTI: ICD-10-CM

## 2022-04-26 PROCEDURE — 99213 OFFICE O/P EST LOW 20 MIN: CPT | Performed by: FAMILY MEDICINE

## 2022-04-26 RX ORDER — PHENAZOPYRIDINE HYDROCHLORIDE 200 MG/1
200 TABLET, FILM COATED ORAL 3 TIMES DAILY PRN
Qty: 6 TABLET | Refills: 0 | Status: SHIPPED | OUTPATIENT
Start: 2022-04-26 | End: 2022-05-03

## 2022-04-26 RX ORDER — CIPROFLOXACIN 500 MG/1
500 TABLET, FILM COATED ORAL 2 TIMES DAILY
Qty: 14 TABLET | Refills: 0 | Status: SHIPPED | OUTPATIENT
Start: 2022-04-26 | End: 2022-05-03

## 2022-04-26 NOTE — TELEPHONE ENCOUNTER
Spoke with patient ( verified)--reports recurrent UTI symptoms--was seen in ER 2022, then saw Dr. Traci Kanner in office after completing Cipro. Patient denies fever, but reports chills, low back pain, urinary urgency, frequency and burning for the past few days--did not go to work yesterday--her son is home sick with Strep. Patient scheduled for video visit today at 56 with Dr. Neha Ramirez. Patient advised to complete the e-check in in [employer], if active. Understands to follow the prompts and links to complete the visit. Patient advised that there may be a co-pay involved in this type of visit. Patient agreed to proceed, they understand the provider may be calling from a blocked, or unknown phone number on their caller ID and they know to answer the phone.     Best call back:  [phone]

## 2022-05-25 ENCOUNTER — TELEPHONE (OUTPATIENT)
Dept: FAMILY MEDICINE CLINIC | Facility: CLINIC | Age: 52
End: 2022-05-25

## 2022-05-25 DIAGNOSIS — R39.89 URINARY PROBLEM: Primary | ICD-10-CM

## 2022-05-25 NOTE — TELEPHONE ENCOUNTER
Patient calling, confirmed name and . Patient with frequent UTI's has not yet scheduled with urology. Complaining of early symptoms of UTI for al teast 3rd time receently and asking for next steps. Please advise.

## 2022-05-26 ENCOUNTER — LAB ENCOUNTER (OUTPATIENT)
Dept: LAB | Facility: HOSPITAL | Age: 52
End: 2022-05-26
Attending: FAMILY MEDICINE
Payer: MEDICAID

## 2022-05-26 PROCEDURE — 87086 URINE CULTURE/COLONY COUNT: CPT | Performed by: FAMILY MEDICINE

## 2022-05-26 NOTE — TELEPHONE ENCOUNTER
Urine culture ordered-needs to do the test asap  Call f worse   Will f/u with results   Also increase fluids   Please call her

## 2022-05-26 NOTE — TELEPHONE ENCOUNTER
Tried calling mobile number twice but only busy signal.  [employer] -son (ALYSHA) and informed about DR Hernandez's order and stated understanding, states that she will tell patient about it and if she has questions  she will call us back. [employer] message sent.

## 2022-05-26 NOTE — TELEPHONE ENCOUNTER
Patient responded to my [employer] message, this nurse tried calling patient but still busy signal.  [employer] message sent ,

## 2022-06-23 ENCOUNTER — PATIENT MESSAGE (OUTPATIENT)
Dept: NEUROLOGY | Facility: CLINIC | Age: 52
End: 2022-06-23

## 2022-07-18 ENCOUNTER — TELEPHONE (OUTPATIENT)
Dept: PHYSICAL THERAPY | Facility: HOSPITAL | Age: 52
End: 2022-07-18

## 2022-07-20 ENCOUNTER — TELEPHONE (OUTPATIENT)
Dept: SPEECH THERAPY | Facility: HOSPITAL | Age: 52
End: 2022-07-20

## 2022-07-25 ENCOUNTER — APPOINTMENT (OUTPATIENT)
Dept: SPEECH THERAPY | Facility: HOSPITAL | Age: 52
End: 2022-07-25
Attending: Other
Payer: MEDICAID

## 2022-07-27 ENCOUNTER — APPOINTMENT (OUTPATIENT)
Dept: SPEECH THERAPY | Facility: HOSPITAL | Age: 52
End: 2022-07-27
Attending: Other
Payer: MEDICAID

## 2022-07-28 ENCOUNTER — HOSPITAL ENCOUNTER (OUTPATIENT)
Dept: MRI IMAGING | Facility: HOSPITAL | Age: 52
Discharge: HOME | End: 2022-07-28
Attending: Other
Payer: MEDICAID

## 2022-07-28 DIAGNOSIS — Q28.3 CEREBRAL CAVERNOMA: ICD-10-CM

## 2022-07-28 DIAGNOSIS — F07.81 POSTCONCUSSION SYNDROME: ICD-10-CM

## 2022-07-28 PROCEDURE — 70551 MRI BRAIN STEM W/O DYE: CPT | Performed by: OTHER

## 2022-07-28 PROCEDURE — 70544 MR ANGIOGRAPHY HEAD W/O DYE: CPT | Performed by: OTHER

## 2022-07-29 NOTE — PROGRESS NOTES
The patient has been notified via [address] Reviewed and electronically signed by:  [facility], [address] [place] [address], [facility]

## 2022-08-01 ENCOUNTER — APPOINTMENT (OUTPATIENT)
Dept: SPEECH THERAPY | Facility: HOSPITAL | Age: 52
End: 2022-08-01
Attending: Other
Payer: MEDICAID

## 2022-08-08 ENCOUNTER — APPOINTMENT (OUTPATIENT)
Dept: SPEECH THERAPY | Facility: HOSPITAL | Age: 52
End: 2022-08-08
Attending: Other
Payer: MEDICAID

## 2022-08-10 ENCOUNTER — APPOINTMENT (OUTPATIENT)
Dept: SPEECH THERAPY | Facility: HOSPITAL | Age: 52
End: 2022-08-10
Attending: Other
Payer: MEDICAID

## 2022-08-15 ENCOUNTER — APPOINTMENT (OUTPATIENT)
Dept: SPEECH THERAPY | Facility: HOSPITAL | Age: 52
End: 2022-08-15
Attending: Other
Payer: MEDICAID

## 2022-08-28 ENCOUNTER — TELEMEDICINE (OUTPATIENT)
Dept: FAMILY MEDICINE CLINIC | Facility: CLINIC | Age: 52
End: 2022-08-28
Payer: MEDICAID

## 2022-08-28 DIAGNOSIS — N30.90 CYSTITIS: Primary | ICD-10-CM

## 2022-08-28 PROCEDURE — 99213 OFFICE O/P EST LOW 20 MIN: CPT | Performed by: FAMILY MEDICINE

## 2022-08-28 RX ORDER — LEVOFLOXACIN 500 MG/1
500 TABLET, FILM COATED ORAL DAILY
Qty: 5 TABLET | Refills: 0 | Status: SHIPPED | OUTPATIENT
Start: 2022-08-28 | End: 2022-09-02

## 2022-08-28 RX ORDER — PHENAZOPYRIDINE HYDROCHLORIDE 200 MG/1
200 TABLET, FILM COATED ORAL 3 TIMES DAILY PRN
Qty: 6 TABLET | Refills: 0 | Status: SHIPPED | OUTPATIENT
Start: 2022-08-28 | End: 2022-08-30

## 2022-08-30 ENCOUNTER — HOSPITAL ENCOUNTER (EMERGENCY)
Facility: HOSPITAL | Age: 52
Discharge: HOME | End: 2022-08-30
Attending: EMERGENCY MEDICINE
Payer: MEDICAID

## 2022-08-30 ENCOUNTER — APPOINTMENT (OUTPATIENT)
Dept: GENERAL RADIOLOGY | Facility: HOSPITAL | Age: 52
End: 2022-08-30
Attending: EMERGENCY MEDICINE
Payer: MEDICAID

## 2022-08-30 ENCOUNTER — TELEPHONE (OUTPATIENT)
Dept: FAMILY MEDICINE CLINIC | Facility: CLINIC | Age: 52
End: 2022-08-30

## 2022-08-30 VITALS
DIASTOLIC BLOOD PRESSURE: 77 MMHG | OXYGEN SATURATION: 99 % | HEIGHT: 60 IN | BODY MASS INDEX: 18.65 KG/M2 | TEMPERATURE: 98 F | HEART RATE: 58 BPM | SYSTOLIC BLOOD PRESSURE: 110 MMHG | WEIGHT: 95 LBS | RESPIRATION RATE: 16 BRPM

## 2022-08-30 DIAGNOSIS — T78.40XA ALLERGIC REACTION, INITIAL ENCOUNTER: ICD-10-CM

## 2022-08-30 DIAGNOSIS — N30.00 ACUTE CYSTITIS WITHOUT HEMATURIA: Primary | ICD-10-CM

## 2022-08-30 LAB
ANION GAP SERPL CALC-SCNC: 5 MMOL/L (ref 0–18)
BASOPHILS # BLD AUTO: 0.03 X10(3) UL (ref 0–0.2)
BASOPHILS NFR BLD AUTO: 0.5 %
BILIRUB UR QL: NEGATIVE
BUN BLD-MCNC: 19 MG/DL (ref 7–18)
BUN/CREAT SERPL: 21.3 (ref 10–20)
CALCIUM BLD-MCNC: 8.7 MG/DL (ref 8.5–10.1)
CHLORIDE SERPL-SCNC: 107 MMOL/L (ref 98–112)
CLARITY UR: CLEAR
CO2 SERPL-SCNC: 29 MMOL/L (ref 21–32)
COLOR UR: YELLOW
CREAT BLD-MCNC: 0.89 MG/DL
DEPRECATED RDW RBC AUTO: 41.5 FL (ref 35.1–46.3)
EOSINOPHIL # BLD AUTO: 0.07 X10(3) UL (ref 0–0.7)
EOSINOPHIL NFR BLD AUTO: 1.2 %
ERYTHROCYTE [DISTWIDTH] IN BLOOD BY AUTOMATED COUNT: 12.6 % (ref 11–15)
GFR SERPLBLD BASED ON 1.73 SQ M-ARVRAT: 77 ML/MIN/1.73M2 (ref 60–?)
GLUCOSE BLD-MCNC: 91 MG/DL (ref 70–99)
GLUCOSE UR-MCNC: NEGATIVE MG/DL
HCT VFR BLD AUTO: 44.9 %
HGB BLD-MCNC: 14.2 G/DL
IMM GRANULOCYTES # BLD AUTO: 0.01 X10(3) UL (ref 0–1)
IMM GRANULOCYTES NFR BLD: 0.2 %
KETONES UR-MCNC: NEGATIVE MG/DL
LEUKOCYTE ESTERASE UR QL STRIP.AUTO: NEGATIVE
LYMPHOCYTES # BLD AUTO: 1.72 X10(3) UL (ref 1–4)
LYMPHOCYTES NFR BLD AUTO: 29.7 %
MAGNESIUM SERPL-MCNC: 2.3 MG/DL (ref 1.6–2.6)
MCH RBC QN AUTO: 28.2 PG (ref 26–34)
MCHC RBC AUTO-ENTMCNC: 31.6 G/DL (ref 31–37)
MCV RBC AUTO: 89.1 FL
MONOCYTES # BLD AUTO: 0.37 X10(3) UL (ref 0.1–1)
MONOCYTES NFR BLD AUTO: 6.4 %
NEUTROPHILS # BLD AUTO: 3.59 X10 (3) UL (ref 1.5–7.7)
NEUTROPHILS # BLD AUTO: 3.59 X10(3) UL (ref 1.5–7.7)
NEUTROPHILS NFR BLD AUTO: 62 %
NITRITE UR QL STRIP.AUTO: POSITIVE
OSMOLALITY SERPL CALC.SUM OF ELEC: 294 MOSM/KG (ref 275–295)
PH UR: 5 [PH] (ref 5–8)
PLATELET # BLD AUTO: 215 10(3)UL (ref 150–450)
POTASSIUM SERPL-SCNC: 3.8 MMOL/L (ref 3.5–5.1)
PROT UR-MCNC: NEGATIVE MG/DL
RBC # BLD AUTO: 5.04 X10(6)UL
SODIUM SERPL-SCNC: 141 MMOL/L (ref 136–145)
SP GR UR STRIP: 1.01 (ref 1–1.03)
TROPONIN I HIGH SENSITIVITY: 4 NG/L
TSI SER-ACNC: 1.76 MIU/ML (ref 0.36–3.74)
UROBILINOGEN UR STRIP-ACNC: 0.2
WBC # BLD AUTO: 5.8 X10(3) UL (ref 4–11)

## 2022-08-30 PROCEDURE — 96361 HYDRATE IV INFUSION ADD-ON: CPT

## 2022-08-30 PROCEDURE — 83735 ASSAY OF MAGNESIUM: CPT | Performed by: EMERGENCY MEDICINE

## 2022-08-30 PROCEDURE — 81015 MICROSCOPIC EXAM OF URINE: CPT | Performed by: EMERGENCY MEDICINE

## 2022-08-30 PROCEDURE — 85025 COMPLETE CBC W/AUTO DIFF WBC: CPT | Performed by: EMERGENCY MEDICINE

## 2022-08-30 PROCEDURE — 81001 URINALYSIS AUTO W/SCOPE: CPT | Performed by: EMERGENCY MEDICINE

## 2022-08-30 PROCEDURE — 84443 ASSAY THYROID STIM HORMONE: CPT | Performed by: EMERGENCY MEDICINE

## 2022-08-30 PROCEDURE — 84484 ASSAY OF TROPONIN QUANT: CPT | Performed by: EMERGENCY MEDICINE

## 2022-08-30 PROCEDURE — 80048 BASIC METABOLIC PNL TOTAL CA: CPT | Performed by: EMERGENCY MEDICINE

## 2022-08-30 PROCEDURE — 99284 EMERGENCY DEPT VISIT MOD MDM: CPT

## 2022-08-30 PROCEDURE — 87086 URINE CULTURE/COLONY COUNT: CPT | Performed by: EMERGENCY MEDICINE

## 2022-08-30 PROCEDURE — 93005 ELECTROCARDIOGRAM TRACING: CPT

## 2022-08-30 PROCEDURE — 96375 TX/PRO/DX INJ NEW DRUG ADDON: CPT

## 2022-08-30 PROCEDURE — S0028 INJECTION, FAMOTIDINE, 20 MG: HCPCS | Performed by: EMERGENCY MEDICINE

## 2022-08-30 PROCEDURE — 71045 X-RAY EXAM CHEST 1 VIEW: CPT | Performed by: EMERGENCY MEDICINE

## 2022-08-30 PROCEDURE — 96374 THER/PROPH/DIAG INJ IV PUSH: CPT

## 2022-08-30 PROCEDURE — 93010 ELECTROCARDIOGRAM REPORT: CPT | Performed by: EMERGENCY MEDICINE

## 2022-08-30 RX ORDER — METHYLPREDNISOLONE SODIUM SUCCINATE 125 MG/2ML
125 INJECTION, POWDER, LYOPHILIZED, FOR SOLUTION INTRAMUSCULAR; INTRAVENOUS ONCE
Status: COMPLETED | OUTPATIENT
Start: 2022-08-30 | End: 2022-08-30

## 2022-08-30 RX ORDER — FAMOTIDINE 10 MG/ML
20 INJECTION, SOLUTION INTRAVENOUS ONCE
Status: COMPLETED | OUTPATIENT
Start: 2022-08-30 | End: 2022-08-30

## 2022-08-30 RX ORDER — CEFDINIR 300 MG/1
300 CAPSULE ORAL 2 TIMES DAILY
Qty: 14 CAPSULE | Refills: 0 | Status: SHIPPED | OUTPATIENT
Start: 2022-08-30 | End: 2022-09-06

## 2022-08-30 RX ORDER — ACETAMINOPHEN 500 MG
1000 TABLET ORAL ONCE
Status: COMPLETED | OUTPATIENT
Start: 2022-08-30 | End: 2022-08-30

## 2022-08-30 RX ORDER — DIPHENHYDRAMINE HYDROCHLORIDE 50 MG/ML
25 INJECTION INTRAMUSCULAR; INTRAVENOUS ONCE
Status: COMPLETED | OUTPATIENT
Start: 2022-08-30 | End: 2022-08-30

## 2022-08-30 NOTE — TELEPHONE ENCOUNTER
FYI Dr [address]: Patient called. She states she received a text message from Dr [address] with video link about 15 minutes ago. Patient was just at ER today for allergic reaction and treated with cefdinir.

## 2022-08-30 NOTE — ED INITIAL ASSESSMENT (HPI)
Patient presents to ER with c/o hives since starting levaquin/pyridium on Sunday for a UTI. Patient reports that rash does improve with benadryl. She reports that her heart has been racing since Sunday as well.

## 2022-08-30 NOTE — TELEPHONE ENCOUNTER
Pt states had video visit with Dr Guerline Justice who was the on call provider 8/28/22. Pt states she was given Levaquin 500 mg for UTI. Pt states she started first dose on Sunday night and developed a rash about an hour later. Pt states she called the pharmacy and the pharmacist told her to continue to take the antibiotic with benadryl. Pt states she continued to take with benadryl, but her rash persists and her heart is racing. Pt denies SOB, tight throat, swollen lips/tongue. Pt states her body feels \"tingly all over\"      Pt denies chest pain, palpitations. Advised since heart is racing to go to ER and pt agrees to plan. Allergy list updated.

## 2022-09-08 ENCOUNTER — TELEPHONE (OUTPATIENT)
Dept: FAMILY MEDICINE CLINIC | Facility: CLINIC | Age: 52
End: 2022-09-08

## 2022-09-08 NOTE — TELEPHONE ENCOUNTER
Patient (name and  verified) states is having abd pain, and maybe a yeast infection. Patient completed the cefdinir but is not better. Recent ER visit on 22. Patient has been having ongoing abd pain. States the pain on the right lower side of the abd which is the same. Patient requesting follow up appt with PCP. Future Appointments   Date Time Provider Tiffany Dimple   2022  9:15 AM Annette Glynn MD [facility]   2022 12:00 PM Nick Barrow MD [facility]   2022  4:30 PM Kenna Llamas MD [address] [facility]     Instructed that if symptoms worsen to go to ER for evaluation. Verbalized understanding. No more appt open today for FM Providers.

## 2022-09-09 ENCOUNTER — OFFICE VISIT (OUTPATIENT)
Dept: FAMILY MEDICINE CLINIC | Facility: CLINIC | Age: 52
End: 2022-09-09
Payer: MEDICAID

## 2022-09-09 VITALS — HEIGHT: 60 IN | BODY MASS INDEX: 19.44 KG/M2 | WEIGHT: 99 LBS

## 2022-09-09 DIAGNOSIS — R10.9 ABDOMINAL DISCOMFORT: ICD-10-CM

## 2022-09-09 DIAGNOSIS — Z87.440 HISTORY OF UTI: Primary | ICD-10-CM

## 2022-09-09 LAB
APPEARANCE: CLEAR
BILIRUBIN: NEGATIVE
GLUCOSE (URINE DIPSTICK): NEGATIVE MG/DL
KETONES (URINE DIPSTICK): NEGATIVE MG/DL
LEUKOCYTES: NEGATIVE
MULTISTIX LOT#: ABNORMAL NUMERIC
NITRITE, URINE: NEGATIVE
PH, URINE: 5 (ref 4.5–8)
PROTEIN (URINE DIPSTICK): NEGATIVE MG/DL
SPECIFIC GRAVITY: 1.03 (ref 1–1.03)
URINE-COLOR: YELLOW
UROBILINOGEN,SEMI-QN: 0.2 MG/DL (ref 0–1.9)

## 2022-09-09 PROCEDURE — 81002 URINALYSIS NONAUTO W/O SCOPE: CPT | Performed by: FAMILY MEDICINE

## 2022-09-09 PROCEDURE — 99214 OFFICE O/P EST MOD 30 MIN: CPT | Performed by: FAMILY MEDICINE

## 2022-09-09 PROCEDURE — 3008F BODY MASS INDEX DOCD: CPT | Performed by: FAMILY MEDICINE

## 2022-09-09 RX ORDER — HYOSCYAMINE SULFATE EXTENDED-RELEASE 0.38 MG/1
0.38 TABLET ORAL EVERY 12 HOURS PRN
Qty: 40 TABLET | Refills: 0 | Status: SHIPPED | OUTPATIENT
Start: 2022-09-09

## 2022-09-29 ENCOUNTER — OFFICE VISIT (OUTPATIENT)
Dept: OPHTHALMOLOGY | Facility: CLINIC | Age: 52
End: 2022-09-29

## 2022-09-29 DIAGNOSIS — R68.89 LIGHT SENSITIVITY: ICD-10-CM

## 2022-09-29 DIAGNOSIS — H52.4 HYPEROPIA WITH PRESBYOPIA OF BOTH EYES: ICD-10-CM

## 2022-09-29 DIAGNOSIS — R51.9 FREQUENT HEADACHES: Primary | ICD-10-CM

## 2022-09-29 DIAGNOSIS — H52.03 HYPEROPIA WITH PRESBYOPIA OF BOTH EYES: ICD-10-CM

## 2022-09-29 PROCEDURE — 92004 COMPRE OPH EXAM NEW PT 1/>: CPT | Performed by: OPHTHALMOLOGY

## 2022-09-29 PROCEDURE — 92015 DETERMINE REFRACTIVE STATE: CPT | Performed by: OPHTHALMOLOGY

## 2022-09-29 NOTE — PATIENT INSTRUCTIONS
Frequent headaches   Patient advised that ocular health is normal in both eyes; no ocular cause of headache found. If symptoms persist, follow up with primary care provider and neurologist as directed. 7/28/22- normal MRI and MRA per Misa Lazar MD.         Hyperopia with presbyopia of both eyes  New glasses Rx given. Suggest update. Light sensitivity  Informed patient to see Neuro- Ophthalmologist at [facility].

## 2022-09-29 NOTE — ASSESSMENT & PLAN NOTE
Patient advised that ocular health is normal in both eyes; no ocular cause of headache found. If symptoms persist, follow up with primary care provider and neurologist as directed.    7/28/22- normal MRI and MRA per Fredo Gupta MD.

## 2022-10-18 ENCOUNTER — NURSE TRIAGE (OUTPATIENT)
Dept: FAMILY MEDICINE CLINIC | Facility: CLINIC | Age: 52
End: 2022-10-18

## 2022-10-18 RX ORDER — AMOXICILLIN 875 MG/1
875 TABLET, COATED ORAL 2 TIMES DAILY
Qty: 20 TABLET | Refills: 0 | Status: SHIPPED | OUTPATIENT
Start: 2022-10-18

## 2022-10-20 NOTE — TELEPHONE ENCOUNTER
Per patient, she was prescribed with antibiotic by Dr Jensen Arnold she also asked for cough medication like benzonatate, patient requesting to send the message to  her PCP for the prescription, Pharmacy verified.

## 2022-10-21 ENCOUNTER — NURSE TRIAGE (OUTPATIENT)
Dept: FAMILY MEDICINE CLINIC | Facility: CLINIC | Age: 52
End: 2022-10-21

## 2022-10-21 RX ORDER — FLUCONAZOLE 150 MG/1
150 TABLET ORAL ONCE
Qty: 1 TABLET | Refills: 0 | Status: SHIPPED | OUTPATIENT
Start: 2022-10-21 | End: 2022-10-21

## 2022-10-21 RX ORDER — PROGESTERONE 100 MG/1
100 CAPSULE ORAL NIGHTLY
Qty: 90 CAPSULE | Refills: 1 | Status: SHIPPED | OUTPATIENT
Start: 2022-10-21

## 2022-10-21 RX ORDER — METRONIDAZOLE 7.5 MG/G
GEL VAGINAL
Refills: 0 | Status: CANCELLED
Start: 2022-10-21

## 2022-10-21 RX ORDER — ESTRADIOL 0.05 MG/D
1 PATCH, EXTENDED RELEASE TRANSDERMAL
Qty: 24 EACH | Refills: 1 | Status: SHIPPED | OUTPATIENT
Start: 2022-10-21

## 2022-10-21 NOTE — TELEPHONE ENCOUNTER
Dr. Sue Escobedo - patient says that these pended Medications were supposed to be reduced, due to tapering off of them. Please advise on correct dosage. Note that the maureen is already pended at a lower dose.

## 2022-10-21 NOTE — TELEPHONE ENCOUNTER
Rx: Vivelle-Dot 0.0375 MG/24H DIS Nova  Sig: Apply 1 patch by onto the skin twice weekly  QTY: 24    Rx: Prometrium Oral Capsule 100MG  Sig: Take one capsule by mouth one time daily  Qty: 90    Rx: MetroNidazole Vaginal Gel 0.75%  Sig: Insert 1 applicatorful vaginally 2 times a day in the morning and evening for 5 days  Qty: 70

## 2022-11-01 ENCOUNTER — HOSPITAL ENCOUNTER (OUTPATIENT)
Dept: CT IMAGING | Facility: HOSPITAL | Age: 52
Discharge: HOME | End: 2022-11-01
Attending: FAMILY MEDICINE
Payer: MEDICAID

## 2022-11-01 DIAGNOSIS — R10.9 ABDOMINAL DISCOMFORT: ICD-10-CM

## 2022-11-01 LAB
CREAT BLD-MCNC: 0.9 MG/DL
GFR SERPLBLD BASED ON 1.73 SQ M-ARVRAT: 76 ML/MIN/1.73M2 (ref 60–?)

## 2022-11-01 PROCEDURE — 82565 ASSAY OF CREATININE: CPT

## 2022-11-01 PROCEDURE — 74177 CT ABD & PELVIS W/CONTRAST: CPT | Performed by: FAMILY MEDICINE

## 2022-11-02 ENCOUNTER — TELEPHONE (OUTPATIENT)
Dept: FAMILY MEDICINE CLINIC | Facility: CLINIC | Age: 52
End: 2022-11-02

## 2022-11-02 DIAGNOSIS — R93.89 ABNORMAL CT SCAN: Primary | ICD-10-CM

## 2022-11-02 NOTE — TELEPHONE ENCOUNTER
Pt called back, advised  message of [address], stated earliest availability is 12/2/22 at [facility] and 11/26/22 at [address] [place] [address] , she's too worried too wait that long unless you feel it's not that Urgent, she trust your judgement   Please advise

## 2022-11-02 NOTE — TELEPHONE ENCOUNTER
Patient contacted clinic regarding CT scan performed 11/1/2022. She saw the results on [employer] and is concerned. She has been in touch with her GYN Dr. Darrel Knight and has a follow up visit scheduled with her next week. Result faxed to [phone]. Dr. Matt Josue please advise if there are any other recommendations.

## 2022-11-04 NOTE — TELEPHONE ENCOUNTER
MD Arturo Feldman, RN; Em Rn Triage 39 minutes ago (2:43 PM)     Please have her speak with her Ob gyn next week ( she has apt )   It appears to me that this is not irgent but would appreciate gyn recommendation too      Patient notified, verbalized understanding.

## 2022-11-07 ENCOUNTER — HOSPITAL ENCOUNTER (OUTPATIENT)
Dept: ULTRASOUND IMAGING | Age: 52
Discharge: HOME | End: 2022-11-07
Attending: FAMILY MEDICINE
Payer: MEDICAID

## 2022-11-07 DIAGNOSIS — R93.89 ABNORMAL CT SCAN: ICD-10-CM

## 2022-11-07 PROCEDURE — 76830 TRANSVAGINAL US NON-OB: CPT | Performed by: FAMILY MEDICINE

## 2022-11-09 ENCOUNTER — TELEPHONE (OUTPATIENT)
Dept: FAMILY MEDICINE CLINIC | Facility: CLINIC | Age: 52
End: 2022-11-09

## 2022-11-09 NOTE — TELEPHONE ENCOUNTER
Spoke with patient, verified name/, reviewed pelvic ultrasound results. She has a video visit tonight with GYN. Patient also asking for Dr Junior Manrique on her recent CT scan.     Per Dr Dianna Hinton 2022:  She should have access to the my chart   You can her results   It is good to see ob gyn as results are not very precise but ob gyn will decide if anything else needs to be done

## 2022-11-09 NOTE — TELEPHONE ENCOUNTER
From: Chana Shah  To: Danelle Asher NP  Sent: 2/6/2020 10:56 AM CST  Subject: After-Visit Question    Thanks for your care last Monday! I do feel 50% better because fevers and congestion have decreased. However, my mostly non-productive deep coughing has increased significantly. It is worsened with moving about and talking and causes SOB. While sleeping and being still it doesn't seem to occur as much. I'm still questioning pneumonia.... thoughts? Thanks. Caryl Shah [phone]   Pt requesting results of pelvic US, has appt with OB/GYNE Dr Claire Brought today

## 2022-11-10 NOTE — TELEPHONE ENCOUNTER
As noted by Nikhil Teresa MD  Em Rn Triage 3 hours ago (9:21 AM)     I left message on the voice mail that ct scan was unremarkable and that she should otherwise discuss findings in the pelvis with Ob Gyn tonight

## 2022-11-11 NOTE — TELEPHONE ENCOUNTER
Left message on patient's voicemail asking her to call Nurse Line if she has any questions about test results that Dr Eboni See had called her about, and to follow up with other doctor tonight.

## 2023-01-16 ENCOUNTER — TELEMEDICINE (OUTPATIENT)
Dept: INTERNAL MEDICINE CLINIC | Facility: CLINIC | Age: 53
End: 2023-01-16

## 2023-01-16 ENCOUNTER — NURSE TRIAGE (OUTPATIENT)
Dept: FAMILY MEDICINE CLINIC | Facility: CLINIC | Age: 53
End: 2023-01-16

## 2023-01-16 DIAGNOSIS — R09.81 SINUS CONGESTION: ICD-10-CM

## 2023-01-16 DIAGNOSIS — R05.2 SUBACUTE COUGH: Primary | ICD-10-CM

## 2023-01-16 DIAGNOSIS — R06.02 SOB (SHORTNESS OF BREATH) ON EXERTION: ICD-10-CM

## 2023-01-16 RX ORDER — AZITHROMYCIN 250 MG/1
TABLET, FILM COATED ORAL
Qty: 6 TABLET | Refills: 0 | Status: SHIPPED | OUTPATIENT
Start: 2023-01-16 | End: 2023-01-21

## 2023-01-16 RX ORDER — BENZONATATE 100 MG/1
100 CAPSULE ORAL 3 TIMES DAILY PRN
Qty: 20 CAPSULE | Refills: 0 | Status: SHIPPED | OUTPATIENT
Start: 2023-01-16 | End: 2023-01-23

## 2023-01-17 ENCOUNTER — HOSPITAL ENCOUNTER (OUTPATIENT)
Dept: GENERAL RADIOLOGY | Facility: HOSPITAL | Age: 53
Discharge: HOME | End: 2023-01-17
Attending: INTERNAL MEDICINE
Payer: MEDICAID

## 2023-01-17 DIAGNOSIS — R06.02 SOB (SHORTNESS OF BREATH) ON EXERTION: ICD-10-CM

## 2023-01-17 DIAGNOSIS — R05.2 SUBACUTE COUGH: ICD-10-CM

## 2023-01-17 PROCEDURE — 71046 X-RAY EXAM CHEST 2 VIEWS: CPT | Performed by: INTERNAL MEDICINE

## 2023-02-03 ENCOUNTER — NURSE TRIAGE (OUTPATIENT)
Dept: FAMILY MEDICINE CLINIC | Facility: CLINIC | Age: 53
End: 2023-02-03

## 2023-02-03 ENCOUNTER — TELEPHONE (OUTPATIENT)
Dept: FAMILY MEDICINE CLINIC | Facility: CLINIC | Age: 53
End: 2023-02-03

## 2023-02-03 NOTE — TELEPHONE ENCOUNTER
Patient was seen 1/16/23 for cough    Patient was prescribed a z-pack and benzonatate 100 MG Oral Cap     Still coughing at night and some nasal congestion, but improved.  She is requesting refill of the benzonatate 100 MG Oral Cap     [Patient will be coming in for an office visit on Monday for another issue]    Pended to prescribing provider for review and authorization, if appropriate

## 2023-02-04 RX ORDER — BENZONATATE 100 MG/1
100 CAPSULE ORAL 3 TIMES DAILY PRN
Qty: 20 CAPSULE | Refills: 0 | Status: SHIPPED | OUTPATIENT
Start: 2023-02-04

## 2023-02-04 NOTE — TELEPHONE ENCOUNTER
Left message to call back. Please transfer to triage. 1st attempt. I have also sent patient a detailed Mychart.

## 2023-02-06 ENCOUNTER — OFFICE VISIT (OUTPATIENT)
Dept: FAMILY MEDICINE CLINIC | Facility: CLINIC | Age: 53
End: 2023-02-06

## 2023-02-06 VITALS
WEIGHT: 102 LBS | HEIGHT: 60 IN | DIASTOLIC BLOOD PRESSURE: 61 MMHG | HEART RATE: 79 BPM | SYSTOLIC BLOOD PRESSURE: 90 MMHG | RESPIRATION RATE: 16 BRPM | TEMPERATURE: 98 F | BODY MASS INDEX: 20.03 KG/M2
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
## 2023-02-06 DIAGNOSIS — S39.92XA INJURY OF COCCYX, INITIAL ENCOUNTER: Primary | ICD-10-CM

## 2023-02-06 RX ORDER — PHENAZOPYRIDINE HYDROCHLORIDE 200 MG/1
TABLET, FILM COATED ORAL
COMMUNITY
Start: 2022-08-28

## 2023-02-06 RX ORDER — SUMATRIPTAN 25 MG/1
25 TABLET, FILM COATED ORAL AS NEEDED
COMMUNITY
Start: 2023-01-02

## 2023-02-06 RX ORDER — NORTRIPTYLINE HYDROCHLORIDE 10 MG/1
10 CAPSULE ORAL NIGHTLY
COMMUNITY
Start: 2023-01-13

## 2023-02-06 RX ORDER — FLUCONAZOLE 150 MG/1
TABLET ORAL
COMMUNITY
Start: 2022-09-18

## 2023-02-07 ENCOUNTER — HOSPITAL ENCOUNTER (OUTPATIENT)
Dept: GENERAL RADIOLOGY | Facility: HOSPITAL | Age: 53
Discharge: HOME | End: 2023-02-07
Attending: FAMILY MEDICINE
Payer: MEDICAID

## 2023-02-07 DIAGNOSIS — S39.92XA INJURY OF COCCYX, INITIAL ENCOUNTER: ICD-10-CM

## 2023-02-07 PROCEDURE — 72220 X-RAY EXAM SACRUM TAILBONE: CPT | Performed by: FAMILY MEDICINE

## 2023-02-17 ENCOUNTER — TELEPHONE (OUTPATIENT)
Dept: FAMILY MEDICINE CLINIC | Facility: CLINIC | Age: 53
End: 2023-02-17

## 2023-02-17 NOTE — TELEPHONE ENCOUNTER
Verified name and . Patient requesting follow up appointment in regards to coccyx injury- was seen by Dr. Josse Traylor on 23. She states symptoms have not improved.     Appointment scheduled:  Future Appointments   Date Time Provider Tiffany Musa   2023  9:20 AM Parmjit Denney MD [facility]

## 2023-02-21 ENCOUNTER — TELEMEDICINE (OUTPATIENT)
Dept: FAMILY MEDICINE CLINIC | Facility: CLINIC | Age: 53
End: 2023-02-21
Payer: MEDICAID

## 2023-02-21 DIAGNOSIS — M54.50 DORSALGIA OF LUMBAR REGION: ICD-10-CM

## 2023-02-21 DIAGNOSIS — M54.16 RADICULOPATHY, LUMBAR REGION: ICD-10-CM
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
## 2023-02-21 PROCEDURE — 99213 OFFICE O/P EST LOW 20 MIN: CPT | Performed by: FAMILY MEDICINE

## 2023-07-24 NOTE — TELEPHONE ENCOUNTER
Patient called, verified Name and . She is requesting for refill of clonazepam. She states PCP prescribed this medication to her before to help her with anxiety and sleep. She is out and wants to have it on hand, usually takes it as needed. Last prescribed 3/8/22. Patient prefers to be called back with provider's response regarding request, currently having problems accessing [employer] account, but will attempt to reset password. clonazePAM 0.5 MG Oral Tab (Discontinued) 60 tablet 0 3/8/2022 2023   Sig:   Take 1 tablet (0.5 mg total) by mouth 2 (two) times daily as needed for Anxiety     Route:   (none)     Order #:   [id number]       Dr. Muna Alvarez please see pended Rx for review and approval if appropriate.

## 2023-07-25 RX ORDER — CLONAZEPAM 0.5 MG/1
0.5 TABLET ORAL 2 TIMES DAILY PRN
Qty: 60 TABLET | Refills: 0 | Status: SHIPPED | OUTPATIENT
Start: 2023-07-25

## 2023-08-23 ENCOUNTER — TELEPHONE (OUTPATIENT)
Dept: PHYSICAL MEDICINE AND REHAB | Facility: CLINIC | Age: 53
End: 2023-08-23

## 2023-08-23 NOTE — TELEPHONE ENCOUNTER
Initiated authorization for Botox CPT/HCPCS [id number], [id number] dx:G43.719 with [employer]  Status: Approved w/ authorization #[id number] valid 8/23/23-2/20/2024  BUY&BILL    Insurance: [employer] Member ID# [id number]  Medication: Botox 200 units  Number of visits Approved: 2- this will be 1 of 2  Dx: U11.179   Last Botox done: n/a  Next Botox due around: new start  Authorization #[id number]  Valid 8/23/23-2/20/2024  BUY&BILL

## 2023-08-29 ENCOUNTER — OFFICE VISIT (OUTPATIENT)
Dept: FAMILY MEDICINE CLINIC | Facility: CLINIC | Age: 53
End: 2023-08-29

## 2023-08-29 VITALS
DIASTOLIC BLOOD PRESSURE: 60 MMHG | WEIGHT: 102 LBS | BODY MASS INDEX: 20.03 KG/M2 | HEIGHT: 60 IN | HEART RATE: 79 BPM | OXYGEN SATURATION: 95 % | SYSTOLIC BLOOD PRESSURE: 91 MMHG

## 2023-08-29 DIAGNOSIS — Z12.31 SCREENING MAMMOGRAM FOR BREAST CANCER: Primary | ICD-10-CM

## 2023-08-29 DIAGNOSIS — Z00.00 ANNUAL PHYSICAL EXAM: ICD-10-CM

## 2023-08-29 DIAGNOSIS — Z12.11 SCREENING FOR COLON CANCER: ICD-10-CM

## 2023-08-29 DIAGNOSIS — M53.82 DISORDER OF NECK: ICD-10-CM

## 2023-08-29 DIAGNOSIS — M54.59 OTHER LOW BACK PAIN: ICD-10-CM

## 2023-08-29 PROCEDURE — 3008F BODY MASS INDEX DOCD: CPT | Performed by: FAMILY MEDICINE

## 2023-08-29 PROCEDURE — 3074F SYST BP LT 130 MM HG: CPT | Performed by: FAMILY MEDICINE

## 2023-08-29 PROCEDURE — 99396 PREV VISIT EST AGE 40-64: CPT | Performed by: FAMILY MEDICINE

## 2023-08-29 PROCEDURE — 3078F DIAST BP <80 MM HG: CPT | Performed by: FAMILY MEDICINE

## 2023-09-18 ENCOUNTER — PATIENT MESSAGE (OUTPATIENT)
Dept: NEUROLOGY | Facility: CLINIC | Age: 53
End: 2023-09-18

## 2023-09-19 NOTE — TELEPHONE ENCOUNTER
Please see today's MyChart encounter. Reviewed and electronically signed by:  [address] [facility], [address] [place] [address], [facility]

## 2023-09-25 ENCOUNTER — LAB ENCOUNTER (OUTPATIENT)
Dept: LAB | Facility: HOSPITAL | Age: 53
End: 2023-09-25
Attending: FAMILY MEDICINE
Payer: MEDICAID

## 2023-09-25 ENCOUNTER — HOSPITAL ENCOUNTER (OUTPATIENT)
Dept: GENERAL RADIOLOGY | Facility: HOSPITAL | Age: 53
Discharge: HOME | End: 2023-09-25
Attending: FAMILY MEDICINE
Payer: MEDICAID

## 2023-09-25 DIAGNOSIS — Z00.00 ANNUAL PHYSICAL EXAM: ICD-10-CM

## 2023-09-25 DIAGNOSIS — M54.59 OTHER LOW BACK PAIN: ICD-10-CM

## 2023-09-25 LAB
ALBUMIN SERPL-MCNC: 3.5 G/DL (ref 3.4–5)
ALBUMIN/GLOB SERPL: 0.9 {RATIO} (ref 1–2)
ALP LIVER SERPL-CCNC: 67 U/L
ALT SERPL-CCNC: 22 U/L
ANION GAP SERPL CALC-SCNC: 6 MMOL/L (ref 0–18)
AST SERPL-CCNC: 18 U/L (ref 15–37)
BASOPHILS # BLD AUTO: 0.03 X10(3) UL (ref 0–0.2)
BASOPHILS NFR BLD AUTO: 0.6 %
BILIRUB SERPL-MCNC: 0.9 MG/DL (ref 0.1–2)
BUN BLD-MCNC: 15 MG/DL (ref 7–18)
BUN/CREAT SERPL: 15.3 (ref 10–20)
CALCIUM BLD-MCNC: 8.9 MG/DL (ref 8.5–10.1)
CHLORIDE SERPL-SCNC: 109 MMOL/L (ref 98–112)
CHOLEST SERPL-MCNC: 253 MG/DL (ref ?–200)
CO2 SERPL-SCNC: 29 MMOL/L (ref 21–32)
CREAT BLD-MCNC: 0.98 MG/DL
DEPRECATED RDW RBC AUTO: 40 FL (ref 35.1–46.3)
EGFRCR SERPLBLD CKD-EPI 2021: 68 ML/MIN/1.73M2 (ref 60–?)
EOSINOPHIL # BLD AUTO: 0.09 X10(3) UL (ref 0–0.7)
EOSINOPHIL NFR BLD AUTO: 1.9 %
ERYTHROCYTE [DISTWIDTH] IN BLOOD BY AUTOMATED COUNT: 12.5 % (ref 11–15)
FASTING PATIENT LIPID ANSWER: YES
FASTING STATUS PATIENT QL REPORTED: YES
GLOBULIN PLAS-MCNC: 3.7 G/DL (ref 2.8–4.4)
GLUCOSE BLD-MCNC: 86 MG/DL (ref 70–99)
HCT VFR BLD AUTO: 47.9 %
HDLC SERPL-MCNC: 70 MG/DL (ref 40–59)
HGB BLD-MCNC: 15.1 G/DL
IMM GRANULOCYTES # BLD AUTO: 0.01 X10(3) UL (ref 0–1)
IMM GRANULOCYTES NFR BLD: 0.2 %
LDLC SERPL CALC-MCNC: 168 MG/DL (ref ?–100)
LYMPHOCYTES # BLD AUTO: 2.27 X10(3) UL (ref 1–4)
LYMPHOCYTES NFR BLD AUTO: 47.6 %
MCH RBC QN AUTO: 27.9 PG (ref 26–34)
MCHC RBC AUTO-ENTMCNC: 31.5 G/DL (ref 31–37)
MCV RBC AUTO: 88.4 FL
MONOCYTES # BLD AUTO: 0.4 X10(3) UL (ref 0.1–1)
MONOCYTES NFR BLD AUTO: 8.4 %
NEUTROPHILS # BLD AUTO: 1.97 X10 (3) UL (ref 1.5–7.7)
NEUTROPHILS # BLD AUTO: 1.97 X10(3) UL (ref 1.5–7.7)
NEUTROPHILS NFR BLD AUTO: 41.3 %
NONHDLC SERPL-MCNC: 183 MG/DL (ref ?–130)
OSMOLALITY SERPL CALC.SUM OF ELEC: 298 MOSM/KG (ref 275–295)
PLATELET # BLD AUTO: 248 10(3)UL (ref 150–450)
POTASSIUM SERPL-SCNC: 3.8 MMOL/L (ref 3.5–5.1)
PROT SERPL-MCNC: 7.2 G/DL (ref 6.4–8.2)
RBC # BLD AUTO: 5.42 X10(6)UL
SODIUM SERPL-SCNC: 144 MMOL/L (ref 136–145)
TRIGL SERPL-MCNC: 89 MG/DL (ref 30–149)
TSI SER-ACNC: 3.2 MIU/ML (ref 0.36–3.74)
VLDLC SERPL CALC-MCNC: 18 MG/DL (ref 0–30)
WBC # BLD AUTO: 4.8 X10(3) UL (ref 4–11)

## 2023-09-25 PROCEDURE — 36415 COLL VENOUS BLD VENIPUNCTURE: CPT

## 2023-09-25 PROCEDURE — 85025 COMPLETE CBC W/AUTO DIFF WBC: CPT

## 2023-09-25 PROCEDURE — 80053 COMPREHEN METABOLIC PANEL: CPT

## 2023-09-25 PROCEDURE — 72110 X-RAY EXAM L-2 SPINE 4/>VWS: CPT | Performed by: FAMILY MEDICINE

## 2023-09-25 PROCEDURE — 80061 LIPID PANEL: CPT

## 2023-09-25 PROCEDURE — 84443 ASSAY THYROID STIM HORMONE: CPT

## 2023-09-26 ENCOUNTER — OFFICE VISIT (OUTPATIENT)
Dept: NEUROLOGY | Facility: CLINIC | Age: 53
End: 2023-09-26
Payer: MEDICAID

## 2023-09-26 DIAGNOSIS — G43.719 INTRACTABLE CHRONIC MIGRAINE WITHOUT AURA AND WITHOUT STATUS MIGRAINOSUS: Primary | ICD-10-CM

## 2023-09-26 PROCEDURE — 64615 CHEMODENERV MUSC MIGRAINE: CPT | Performed by: OTHER

## 2023-09-26 NOTE — PROGRESS NOTES
Paperwork noting that patient may bear financial responsibility for procedure(s) performed in clinic today signed prior to proceeding with procedure(s). Furthermore, patient notified that they should contact their insurer to verify that your procedure/test has been approved and is a COVERED benefit. Although the [facility] staff does its due diligence, the insurance carrier gives the disclaimer that \"Although the procedure is authorized, this does not guarantee payment. \"    Ultimately the patient is responsible for payment.     Botox is:  [] Buy and Bill  [] Patient Supplied      New start

## 2023-10-20 ENCOUNTER — TELEPHONE (OUTPATIENT)
Dept: FAMILY MEDICINE CLINIC | Facility: CLINIC | Age: 53
End: 2023-10-20

## 2023-10-20 NOTE — TELEPHONE ENCOUNTER
Action Requested: Summary for Provider     []  Critical Lab, Recommendations Needed  [] Need Additional Advice  []   FYI    []   Need Orders  [x] Need Medications Sent to Pharmacy  []  Other     SUMMARY: Please advise on Refill Request x 2 pended, Gynecologist cannot order for patient, reportedly, Retiring and Insurance Coverage issues. Gynecologist advised that this be ordered by PCP. Progesterone and Zeny Patch         Reason for call: Condition Update (Gynecologist Recommends to restart medication)      Patient called office. Date of birth and full name both confirmed. Gynecologist with [address] [place] [address]. Had visit with Gynecologist yesterday. Per patient, Gyencologist could not order prescriptions due to insurance coverage reasons, but gynecologist does recommend that patient restart both: ZENY 0.05mg/24hr Patch, and Progesterone, and advised that patient get these prescriptions from Dr. Meseret Hayward. Chrissie on File verified. 2 Prescriptions pended.

## 2023-10-22 RX ORDER — PROGESTERONE 100 MG/1
100 CAPSULE ORAL NIGHTLY
Qty: 90 CAPSULE | Refills: 1 | Status: SHIPPED | OUTPATIENT
Start: 2023-10-22

## 2023-10-22 RX ORDER — ESTRADIOL 0.05 MG/D
1 PATCH, EXTENDED RELEASE TRANSDERMAL
Qty: 24 EACH | Refills: 1 | Status: SHIPPED | OUTPATIENT
Start: 2023-10-22

## 2023-10-23 NOTE — TELEPHONE ENCOUNTER
First Call attempt. Detailed message that medication x 2 was sent to pharmacy. If further question,  to call back our office. Office phone number provided with office hours. Advised check [employer] message .

## 2023-11-26 ENCOUNTER — APPOINTMENT (OUTPATIENT)
Dept: GENERAL RADIOLOGY | Age: 53
End: 2023-11-26
Attending: EMERGENCY MEDICINE
Payer: MEDICAID

## 2023-11-26 ENCOUNTER — TELEPHONE (OUTPATIENT)
Dept: FAMILY MEDICINE CLINIC | Facility: CLINIC | Age: 53
End: 2023-11-26

## 2023-11-26 ENCOUNTER — HOSPITAL ENCOUNTER (OUTPATIENT)
Age: 53
Discharge: HOME | End: 2023-11-26
Attending: EMERGENCY MEDICINE
Payer: MEDICAID

## 2023-11-26 VITALS
HEART RATE: 97 BPM | TEMPERATURE: 99 F | DIASTOLIC BLOOD PRESSURE: 70 MMHG | OXYGEN SATURATION: 97 % | SYSTOLIC BLOOD PRESSURE: 105 MMHG | RESPIRATION RATE: 16 BRPM

## 2023-11-26 DIAGNOSIS — R05.1 ACUTE COUGH: Primary | ICD-10-CM

## 2023-11-26 DIAGNOSIS — R05.3 PERSISTENT COUGH FOR 3 WEEKS OR LONGER: ICD-10-CM

## 2023-11-26 DIAGNOSIS — J01.41 ACUTE RECURRENT PANSINUSITIS: Primary | ICD-10-CM

## 2023-11-26 PROCEDURE — 71046 X-RAY EXAM CHEST 2 VIEWS: CPT | Performed by: EMERGENCY MEDICINE

## 2023-11-26 PROCEDURE — 99214 OFFICE O/P EST MOD 30 MIN: CPT

## 2023-11-26 PROCEDURE — 99213 OFFICE O/P EST LOW 20 MIN: CPT

## 2023-11-26 RX ORDER — CODEINE PHOSPHATE AND GUAIFENESIN 10; 100 MG/5ML; MG/5ML
5 SOLUTION ORAL EVERY 6 HOURS PRN
Qty: 118 ML | Refills: 0 | Status: SHIPPED | OUTPATIENT
Start: 2023-11-26 | End: 2023-11-27

## 2023-11-26 RX ORDER — AMOXICILLIN AND CLAVULANATE POTASSIUM 875; 125 MG/1; MG/1
1 TABLET, FILM COATED ORAL 2 TIMES DAILY
Qty: 20 TABLET | Refills: 0 | Status: SHIPPED | OUTPATIENT
Start: 2023-11-26 | End: 2023-12-06

## 2023-11-26 RX ORDER — BENZONATATE 100 MG/1
100 CAPSULE ORAL 3 TIMES DAILY PRN
Qty: 30 CAPSULE | Refills: 0 | Status: SHIPPED | OUTPATIENT
Start: 2023-11-26 | End: 2023-12-26

## 2023-11-26 NOTE — ED INITIAL ASSESSMENT (HPI)
Maryana arrives ambulatory with c/o continued cough/congestions x 3 weeks. Denies fevers. Reports when this started she also had chills body aches and sinus issues foe several days, but these symptoms resolved over 2 weeks ago. Reports coughing so much that she is having migraines and is unable to sleep.

## 2023-11-27 RX ORDER — CODEINE PHOSPHATE AND GUAIFENESIN 10; 100 MG/5ML; MG/5ML
5 SOLUTION ORAL EVERY 6 HOURS PRN
Qty: 118 ML | Refills: 0 | Status: SHIPPED | OUTPATIENT
Start: 2023-11-27

## 2023-11-27 NOTE — TELEPHONE ENCOUNTER
Left detailed voicemail to call back our office with any further questions. Office phone number provided with telephone hours.

## 2023-11-27 NOTE — TELEPHONE ENCOUNTER
Message noted. May resend to [facility] as requested. Erx sent to listed pharmacy.   Please notify patient

## 2023-11-27 NOTE — TELEPHONE ENCOUNTER
Patient called, verified name/. Was not able to get Cheratussin because Emily was out of it. Asking to please send script to [address] for review,  Dr Radha Buck not in the office, routed to Dr Zenaida Guevara, please advise on pended refill.        guaiFENesin-codeine (CHERATUSSIN AC) 100-10 MG/5ML Oral Solution 118 mL 0 2023     Sig - Route: Take 5 mL by mouth every 6 (six) hours as needed.  - Oral    Sent to pharmacy as: guaiFENesin-Codeine 100-10 MG/5ML Oral Solution    E-Prescribing Status: Receipt confirmed by pharmacy (2023  7:01 PM CST)      Associated Diagnoses    Acute cough  - [facility] [address], [address] [facility]  [address] [place] [address], [phone], [phone]

## 2023-11-27 NOTE — TELEPHONE ENCOUNTER
On-call note. Patient states that she has a viral URI and was seen in urgent care earlier today. Tessalon Perles not helping cough. Needs something stronger as the cough is worsening her migraine. Cheratussin sent to pharmacy. Advised patient to watch her oxygen and her blood pressure. Red flags discussed to go to the ER.

## 2023-12-06 ENCOUNTER — NURSE TRIAGE (OUTPATIENT)
Dept: FAMILY MEDICINE CLINIC | Facility: CLINIC | Age: 53
End: 2023-12-06

## 2023-12-06 RX ORDER — FLUCONAZOLE 150 MG/1
150 TABLET ORAL DAILY
Qty: 2 TABLET | Refills: 0 | Status: SHIPPED | OUTPATIENT
Start: 2023-12-06

## 2023-12-06 NOTE — TELEPHONE ENCOUNTER
Action Requested: Summary for Provider     []  Critical Lab, Recommendations Needed  [x] Need Additional Advice  []   FYI    []   Need Orders  [x] Need Medications Sent to Pharmacy  []  Other     SUMMARY: Per protocol advised : Office visit declined , cannot miss any more time from work       Reason for call: Vaginal Discharge  Onset: Data Unavailable      Patient calling ( identified name and  ) states LOV   and just completed Augmentin last night and now has a yeast infection    Has white.   Thick discharge  vaginal itching and burning     Offered office visit,declines as she has missed two weeks of work with this virus ,asking for Diflucan      Allergies reviewed and pharmacy confirmed  Routing to POD mate as Rut Ballard   is now out of the office         Reason for Disposition   Symptoms of a yeast infection' (i.e., itchy, white discharge, not bad smelling) and not improved > 3 days following Care Advice    Protocols used: Vaginal Mhkfybqwg-D-BI

## 2023-12-06 NOTE — TELEPHONE ENCOUNTER
Patient has read [employer] message  [employer] User Last Read On   Chanel Segura 12/6/2023  4:29 PM       Closing this encounter.

## 2024-01-03 ENCOUNTER — APPOINTMENT (OUTPATIENT)
Dept: CT IMAGING | Facility: HOSPITAL | Age: 54
End: 2024-01-03
Attending: STUDENT IN AN ORGANIZED HEALTH CARE EDUCATION/TRAINING PROGRAM
Payer: MEDICAID

## 2024-01-03 ENCOUNTER — TELEPHONE (OUTPATIENT)
Dept: FAMILY MEDICINE CLINIC | Facility: CLINIC | Age: 54
End: 2024-01-03

## 2024-01-03 ENCOUNTER — HOSPITAL ENCOUNTER (EMERGENCY)
Facility: HOSPITAL | Age: 54
Discharge: HOME | End: 2024-01-04
Attending: STUDENT IN AN ORGANIZED HEALTH CARE EDUCATION/TRAINING PROGRAM
Payer: MEDICAID

## 2024-01-03 ENCOUNTER — APPOINTMENT (OUTPATIENT)
Dept: GENERAL RADIOLOGY | Facility: HOSPITAL | Age: 54
End: 2024-01-03
Payer: MEDICAID

## 2024-01-03 VITALS
BODY MASS INDEX: 19.63 KG/M2 | RESPIRATION RATE: 15 BRPM | TEMPERATURE: 98 F | HEIGHT: 60 IN | WEIGHT: 100 LBS | HEART RATE: 78 BPM | SYSTOLIC BLOOD PRESSURE: 92 MMHG | OXYGEN SATURATION: 100 % | DIASTOLIC BLOOD PRESSURE: 65 MMHG

## 2024-01-03 DIAGNOSIS — J40 BRONCHITIS: Primary | ICD-10-CM

## 2024-01-03 LAB
ANION GAP SERPL CALC-SCNC: 7 MMOL/L (ref 0–18)
BASOPHILS # BLD AUTO: 0.04 X10(3) UL (ref 0–0.2)
BASOPHILS NFR BLD AUTO: 0.4 %
BUN BLD-MCNC: 15 MG/DL (ref 9–23)
BUN/CREAT SERPL: 18.3 (ref 10–20)
CALCIUM BLD-MCNC: 9.6 MG/DL (ref 8.7–10.4)
CHLORIDE SERPL-SCNC: 102 MMOL/L (ref 98–112)
CO2 SERPL-SCNC: 30 MMOL/L (ref 21–32)
CREAT BLD-MCNC: 0.82 MG/DL
DEPRECATED RDW RBC AUTO: 38.1 FL (ref 35.1–46.3)
EGFRCR SERPLBLD CKD-EPI 2021: 84 ML/MIN/1.73M2 (ref 60–?)
EOSINOPHIL # BLD AUTO: 0.04 X10(3) UL (ref 0–0.7)
EOSINOPHIL NFR BLD AUTO: 0.4 %
ERYTHROCYTE [DISTWIDTH] IN BLOOD BY AUTOMATED COUNT: 12.4 % (ref 11–15)
FLUAV + FLUBV RNA SPEC NAA+PROBE: NEGATIVE
FLUAV + FLUBV RNA SPEC NAA+PROBE: NEGATIVE
GLUCOSE BLD-MCNC: 93 MG/DL (ref 70–99)
GLUCOSE BLDC GLUCOMTR-MCNC: 75 MG/DL (ref 70–99)
HCT VFR BLD AUTO: 44.3 %
HGB BLD-MCNC: 15 G/DL
IMM GRANULOCYTES # BLD AUTO: 0.02 X10(3) UL (ref 0–1)
IMM GRANULOCYTES NFR BLD: 0.2 %
LYMPHOCYTES # BLD AUTO: 1.56 X10(3) UL (ref 1–4)
LYMPHOCYTES NFR BLD AUTO: 17.5 %
MCH RBC QN AUTO: 28.8 PG (ref 26–34)
MCHC RBC AUTO-ENTMCNC: 33.9 G/DL (ref 31–37)
MCV RBC AUTO: 85.2 FL
MONOCYTES # BLD AUTO: 0.48 X10(3) UL (ref 0.1–1)
MONOCYTES NFR BLD AUTO: 5.4 %
NEUTROPHILS # BLD AUTO: 6.75 X10 (3) UL (ref 1.5–7.7)
NEUTROPHILS # BLD AUTO: 6.75 X10(3) UL (ref 1.5–7.7)
NEUTROPHILS NFR BLD AUTO: 76.1 %
OSMOLALITY SERPL CALC.SUM OF ELEC: 289 MOSM/KG (ref 275–295)
PLATELET # BLD AUTO: 248 10(3)UL (ref 150–450)
POTASSIUM SERPL-SCNC: 4 MMOL/L (ref 3.5–5.1)
RBC # BLD AUTO: 5.2 X10(6)UL
RSV RNA SPEC NAA+PROBE: NEGATIVE
SARS-COV-2 RNA RESP QL NAA+PROBE: NOT DETECTED
SODIUM SERPL-SCNC: 139 MMOL/L (ref 136–145)
TROPONIN I SERPL HS-MCNC: <3 NG/L
WBC # BLD AUTO: 8.9 X10(3) UL (ref 4–11)

## 2024-01-03 PROCEDURE — 99284 EMERGENCY DEPT VISIT MOD MDM: CPT

## 2024-01-03 PROCEDURE — 85025 COMPLETE CBC W/AUTO DIFF WBC: CPT

## 2024-01-03 PROCEDURE — 0241U SARS-COV-2/FLU A AND B/RSV BY PCR (GENEXPERT): CPT | Performed by: STUDENT IN AN ORGANIZED HEALTH CARE EDUCATION/TRAINING PROGRAM

## 2024-01-03 PROCEDURE — 71045 X-RAY EXAM CHEST 1 VIEW: CPT

## 2024-01-03 PROCEDURE — 96374 THER/PROPH/DIAG INJ IV PUSH: CPT

## 2024-01-03 PROCEDURE — 85025 COMPLETE CBC W/AUTO DIFF WBC: CPT | Performed by: STUDENT IN AN ORGANIZED HEALTH CARE EDUCATION/TRAINING PROGRAM

## 2024-01-03 PROCEDURE — 99285 EMERGENCY DEPT VISIT HI MDM: CPT

## 2024-01-03 PROCEDURE — 84484 ASSAY OF TROPONIN QUANT: CPT | Performed by: STUDENT IN AN ORGANIZED HEALTH CARE EDUCATION/TRAINING PROGRAM

## 2024-01-03 PROCEDURE — 93010 ELECTROCARDIOGRAM REPORT: CPT

## 2024-01-03 PROCEDURE — 80048 BASIC METABOLIC PNL TOTAL CA: CPT

## 2024-01-03 PROCEDURE — 71260 CT THORAX DX C+: CPT | Performed by: STUDENT IN AN ORGANIZED HEALTH CARE EDUCATION/TRAINING PROGRAM

## 2024-01-03 PROCEDURE — 82962 GLUCOSE BLOOD TEST: CPT

## 2024-01-03 PROCEDURE — 80048 BASIC METABOLIC PNL TOTAL CA: CPT | Performed by: STUDENT IN AN ORGANIZED HEALTH CARE EDUCATION/TRAINING PROGRAM

## 2024-01-03 PROCEDURE — S0028 INJECTION, FAMOTIDINE, 20 MG: HCPCS | Performed by: STUDENT IN AN ORGANIZED HEALTH CARE EDUCATION/TRAINING PROGRAM

## 2024-01-03 PROCEDURE — 96361 HYDRATE IV INFUSION ADD-ON: CPT

## 2024-01-03 PROCEDURE — 96375 TX/PRO/DX INJ NEW DRUG ADDON: CPT

## 2024-01-03 PROCEDURE — 93005 ELECTROCARDIOGRAM TRACING: CPT

## 2024-01-03 RX ORDER — PREDNISONE 50 MG/1
50 TABLET ORAL DAILY
Qty: 5 TABLET | Refills: 0 | Status: SHIPPED | OUTPATIENT
Start: 2024-01-03 | End: 2024-01-08

## 2024-01-03 RX ORDER — GUAIFENESIN 600 MG/1
1200 TABLET, EXTENDED RELEASE ORAL 2 TIMES DAILY
Qty: 28 TABLET | Refills: 0 | Status: SHIPPED | OUTPATIENT
Start: 2024-01-03 | End: 2024-01-10

## 2024-01-03 RX ORDER — METOCLOPRAMIDE HYDROCHLORIDE 5 MG/ML
10 INJECTION INTRAMUSCULAR; INTRAVENOUS ONCE
Status: COMPLETED | OUTPATIENT
Start: 2024-01-03 | End: 2024-01-03

## 2024-01-03 RX ORDER — AZITHROMYCIN 250 MG/1
TABLET, FILM COATED ORAL
Qty: 6 TABLET | Refills: 0 | Status: SHIPPED | OUTPATIENT
Start: 2024-01-03 | End: 2024-01-03

## 2024-01-03 RX ORDER — DEXAMETHASONE SODIUM PHOSPHATE 4 MG/ML
10 VIAL (ML) INJECTION ONCE
Status: COMPLETED | OUTPATIENT
Start: 2024-01-03 | End: 2024-01-03

## 2024-01-03 RX ORDER — FAMOTIDINE 10 MG/ML
20 INJECTION, SOLUTION INTRAVENOUS ONCE
Status: COMPLETED | OUTPATIENT
Start: 2024-01-03 | End: 2024-01-03

## 2024-01-03 RX ORDER — MORPHINE SULFATE 4 MG/ML
4 INJECTION, SOLUTION INTRAMUSCULAR; INTRAVENOUS ONCE
Status: COMPLETED | OUTPATIENT
Start: 2024-01-03 | End: 2024-01-03

## 2024-01-03 RX ORDER — KETOROLAC TROMETHAMINE 15 MG/ML
15 INJECTION, SOLUTION INTRAMUSCULAR; INTRAVENOUS ONCE
Status: COMPLETED | OUTPATIENT
Start: 2024-01-03 | End: 2024-01-03
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
## 2024-01-03 NOTE — ED INITIAL ASSESSMENT (HPI)
Patient presents to ER with complaints of being sick x 8 weeks, notes symptoms worse over the last 2 days.   Notes severe headache, productive cough with white sputum & blood, fatigue, HUBER and hoarse/loss of voice.  Patient had chest xray and given augmentin for Bronchitis end of November

## 2024-01-04 ENCOUNTER — TELEPHONE (OUTPATIENT)
Dept: FAMILY MEDICINE CLINIC | Facility: CLINIC | Age: 54
End: 2024-01-04

## 2024-01-04 LAB
ATRIAL RATE: 62 BPM
P AXIS: 26 DEGREES
P-R INTERVAL: 122 MS
Q-T INTERVAL: 422 MS
QRS DURATION: 76 MS
QTC CALCULATION (BEZET): 428 MS
R AXIS: 67 DEGREES
T AXIS: 55 DEGREES
TROPONIN I SERPL HS-MCNC: <3 NG/L
VENTRICULAR RATE: 62 BPM

## 2024-01-04 NOTE — DISCHARGE INSTRUCTIONS
Please follow-up with your primary care provider within 1 week for reevaluation of your symptoms.  Return to the emergency department if you develop chest pain, fevers, vomiting as this be signs of worsening medical condition.

## 2024-01-04 NOTE — TELEPHONE ENCOUNTER
Patient (name and  verified) calling to schedule a ER follow up appt with PCP as she was instructed to do per ER instructions.    Patient instructed to follow up as soon as possible. Patient booked with next open appt. Noted limited appt options for FM schedule.    Assisted patient with appt scheduling, verbalized understanding and agrees to plan.   Future Appointments   Date Time Provider Department Center   2024 11:50 AM Teofilo Hawthorne MD [facility] MATE[facility] Betancourt

## 2024-01-04 NOTE — ED QUICK NOTES
Patient started having upper mid abdominal pain that was described as stabbing. MD notified and Pepcid was ordered and given then She stated it was hard for her to breathe. RN put on tele and noticed HR 45 but bounced back up to 65. Patient became diaphoretic and stated \":I don't feel good, I'm gonna be sick\", RN ordered EKG and notified MD.

## 2024-01-04 NOTE — ED QUICK NOTES
Patient A&OX4, denies SOB/CP/Dizziness. No pain. Discharge instructions given. All questions answered. Ambulatory home.

## 2024-01-04 NOTE — ED PROVIDER NOTES
[facility] Emergency Department Note  Patient: Chanel Segura Age: 53 year old Sex: female      MRN: [id number]  : 1970    Patient Seen in: [facility] Emergency Department    History     Chief Complaint   Patient presents with    Cough/URI    Headache    Difficulty Breathing     Stated Complaint: bronchitis x8 weeks    History obtained from: Patient    Patient is a 53-year-old female with a past medical history of migraines, endometriosis, kidney stones presents today for evaluation of headache, cough, fatigue, dyspnea on exertion, and raspy voice over the past 8 weeks.  She states that she was initially seen in urgent care facility when the onset of her symptoms and diagnosed with bronchitis and treated with prednisone and Augmentin with mild improvement of her symptoms.  She states that after completing course of these medications, the symptoms persisted.  States that she feels that the cough is worsened over the past 1 week.  She states over the past 2 days, she has had 3 episodes of hemoptysis coughing up small blood clots.  Denies any lower extremity edema.  Denies any fevers.  States that she called her PCP office to schedule appointment but was directed to the emergency department given hemoptysis.    Review of Systems:  Review of Systems  Positive for stated complaint: bronchitis x8 weeks. Constitutional and vital signs reviewed. All other systems reviewed and negative except as noted above.    Patient History:  Past Medical History:   Diagnosis Date    Allergic rhinitis     Endometriosis     Hypoglycemia     Kidney stones     UTI (urinary tract infection)        Past Surgical History:   Procedure Laterality Date    CHOLECYSTECTOMY      OTHER SURGICAL HISTORY      laparscopy for endometriosis x 5    OTHER SURGICAL HISTORY      oral surgery        Family History   Problem Relation Age of Onset    Diabetes Father     Other (Other) Father         COPD    Heart Disorder Mother         CAD and Stroke  (CVA)    Hypertension Mother     Arthritis Mother     Cancer Sister         Lymphoma    Macular degeneration Neg     Glaucoma Neg        Specific Social Determinants of Health:   Social History     Socioeconomic History    Marital status:    Tobacco Use    Smoking status: Never    Smokeless tobacco: Never   Vaping Use    Vaping Use: Never used   Substance and Sexual Activity    Alcohol use: Yes     Alcohol/week: 1.0 standard drink of alcohol     Types: 1 Glasses of wine per week    Drug use: No   Other Topics Concern    Caffeine Concern Yes     Comment: tea 3 cups    Exercise No    Seat Belt No    Special Diet No    Stress Concern No    Weight Concern No           PSFH elements reviewed from today and agreed except as otherwise stated in HPI.    Physical Exam     ED Triage Vitals [01/03/24 1551]   /69   Pulse 80   Resp 18   Temp 97.6 °F (36.4 °C)   Temp src    SpO2 100 %   O2 Device None (Room air)       Current:BP 92/65   Pulse 78   Temp 97.6 °F (36.4 °C)   Resp 15   Ht 152.4 cm (5')   Wt 45.4 kg   SpO2 100%   BMI 19.53 kg/m²         Physical Exam  Constitutional:       General: She is not in acute distress.  HENT:      Head: Normocephalic and atraumatic.      Mouth/Throat:      Mouth: Mucous membranes are moist.   Eyes:      Extraocular Movements: Extraocular movements intact.   Cardiovascular:      Rate and Rhythm: Normal rate and regular rhythm.      Heart sounds: Normal heart sounds.   Pulmonary:      Effort: Pulmonary effort is normal. No respiratory distress.      Breath sounds: Normal breath sounds.   Abdominal:      Palpations: Abdomen is soft.      Tenderness: There is no abdominal tenderness.   Skin:     General: Skin is warm and dry.      Capillary Refill: Capillary refill takes less than 2 seconds.      Findings: No rash.   Neurological:      General: No focal deficit present.      Mental Status: She is alert and oriented to person, place, and time.   Psychiatric:         Mood and  Affect: Mood normal.         Behavior: Behavior normal.         ED Course   Labs:   Labs Reviewed   BASIC METABOLIC PANEL (8) - Normal   TROPONIN I HIGH SENSITIVITY - Normal   POCT GLUCOSE - Normal   SARS-COV-2/FLU A AND B/RSV BY PCR (GENEXPERT) - Normal    Narrative:     This test is intended for the qualitative detection and differentiation of SARS-CoV-2, influenza A, influenza B, and respiratory syncytial virus (RSV) viral RNA in nasopharyngeal or nares swabs from individuals suspected of respiratory viral infection consistent with COVID-19 by their healthcare provider. Signs and symptoms of respiratory viral infection due to SARS-CoV-2, influenza, and RSV can be similar.    Test performed using the Xpert Xpress SARS-CoV-2/FLU/RSV (real time RT-PCR)  assay on the [employer] instrument, [employer], [place] [address].   This test is being used under the Food and Drug Administration's Emergency Use Authorization.    The authorized Fact Sheet for Healthcare Providers for this assay is available upon request from the laboratory.   CBC WITH DIFFERENTIAL WITH PLATELET    Narrative:     The following orders were created for panel order CBC With Differential With Platelet.  Procedure                               Abnormality         Status                     ---------                               -----------         ------                     CBC W/ DIFFERENTIAL[[id number]]                              Final result                 Please view results for these tests on the individual orders.   TROPONIN I HIGH SENSITIVITY   RAINBOW DRAW LAVENDER   RAINBOW DRAW LIGHT GREEN   RAINBOW DRAW BLUE   CBC W/ DIFFERENTIAL     Radiology findings:  I personally reviewed the images.   CT CHEST PE AORTA (IV ONLY) (CPT=71260)    Result Date: 1/3/2024  CONCLUSION:   1. No acute pulmonary embolism. 2. Small airways disease demonstrated by multifocal peribronchial thickening and scattered mucus plugging. 3. No pneumonia, pleural effusion,  or pneumothorax. 4. Mild bibasilar atelectasis as well as minimal atelectasis involving the right middle lobe and lingula.  5. Lesser incidental findings described above.    Dictated by (CST): Armando Zavala MD on 1/03/2024 at 9:37 PM     Finalized by (CST): Armando Zavala MD on 1/03/2024 at 9:42 PM          XR CHEST AP PORTABLE  (CPT=71045)    Result Date: 1/3/2024  CONCLUSION:   No focal opacity, pleural effusion, or pneumothorax.    Dictated by (CST): Armando Zavala MD on 1/03/2024 at 5:29 PM     Finalized by (CST): Armando Zavala MD on 1/03/2024 at 5:30 PM           EKG as interpreted by me: Ventricular rate 63, normal sinus rhythm, normal axis, no WV interval prolongation, narrow QRS, QTc 431 ms, no ST segment elevations or depressions, no abnormal T wave inversions  Cardiac Monitor: Interpreted by me.   Pulse Readings from Last 1 Encounters:   01/03/24 78   , sinus,     External non-ED records reviewed independently by me: Cardiac echo stress test obtained on 7/11/2016 with no evidence of ischemia.    MDM   53-year-old female with past medical history of endometriosis, kidney stones presenting for evaluation of headache, cough, fatigue, dyspnea on exertion, raspy voice over the past 8 weeks despite treatment for bronchitis when symptoms began.  Upon arrival emergency department, patient's vitals are within normal limits.  She is well-appearing and in no acute distress.  Lungs are clear to auscultation bilaterally with no increased work of breathing.  Abdomen soft nontender.    Differential diagnoses considered includes, but is not limited to: Bronchitis, pneumonia, pleural effusions, malignancy, pulmonary embolism    Will obtain the following tests: CBC, BMP, COVID/flu/RSV panel, CT PE, chest x-ray, EKG  Please see ED course for my independent review of these tests/imaging results.    Initial Medications/Therapeutics administered: Reglan, Decadron, Toradol, morphine, IV fluid bolus    Chronic conditions  affecting care: Migraines, endometriosis, kidney stones    Workup and medications considered but not ordered: None    Social Determinants of Health that impacted care: None    ED Course as of 01/03/24 2332  ------------------------------------------------------------  Time: 01/03 2140  Comment: I was called the patient's bedside as patient began having sharp epigastric abdominal pain.  Was noted by ED nurse to be diaphoretic.  Pepcid given for suspected gastritis.  EKG obtained with no evidence of ischemic injury.  Will obtain troponin and trend.  ------------------------------------------------------------  Time: 01/03 2145  Comment: Labs reassuring with no evidence of electrolyte or metabolic derangement.  No leukocytosis.  I independently reviewed the CTPE which shows no evidence of pulmonary embolism.  Does show small airway disease consistent with viral etiology.  No pneumonia.  ------------------------------------------------------------  Time: 01/03 2328  Comment: Initial troponin negative.  2-hour troponin pending at this time.  Patient's case signed out to oncoming ED provider.  Anticipate discharge home with continued treatment for bronchitis if repeat troponin is negative.        Disposition and Plan     Clinical Impression:  1. Bronchitis        Disposition:  There is no disposition on file for this visit.    Follow-up:  Pia Hernandez MD  [address] [place] [address] [phone]    Schedule an appointment as soon as possible for a visit in 2 day(s)  As needed, If symptoms worsen      Medications Prescribed:  Current Discharge Medication List        START taking these medications    Details   guaiFENesin  MG Oral Tablet 12 Hr Take 2 tablets (1,200 mg total) by mouth 2 (two) times daily for 7 days.  Qty: 28 tablet, Refills: 0      predniSONE 50 MG Oral Tab Take 1 tablet (50 mg total) by mouth daily for 5 days.  Qty: 5 tablet, Refills: 0               This note may have been created using  voice dictation technology and may include inadvertent errors.      Robyn MD [facility]  Emergency Medicine

## 2024-01-06 ENCOUNTER — OFFICE VISIT (OUTPATIENT)
Dept: FAMILY MEDICINE CLINIC | Facility: CLINIC | Age: 54
End: 2024-01-06

## 2024-01-06 VITALS
DIASTOLIC BLOOD PRESSURE: 60 MMHG | WEIGHT: 100.19 LBS | TEMPERATURE: 98 F | OXYGEN SATURATION: 96 % | BODY MASS INDEX: 19.67 KG/M2 | HEIGHT: 60 IN | HEART RATE: 74 BPM | SYSTOLIC BLOOD PRESSURE: 95 MMHG

## 2024-01-06 DIAGNOSIS — J40 BRONCHITIS: Primary | ICD-10-CM

## 2024-01-06 PROCEDURE — 3078F DIAST BP <80 MM HG: CPT | Performed by: FAMILY MEDICINE

## 2024-01-06 PROCEDURE — 99213 OFFICE O/P EST LOW 20 MIN: CPT | Performed by: FAMILY MEDICINE

## 2024-01-06 PROCEDURE — 3008F BODY MASS INDEX DOCD: CPT | Performed by: FAMILY MEDICINE

## 2024-01-06 PROCEDURE — 3074F SYST BP LT 130 MM HG: CPT | Performed by: FAMILY MEDICINE

## 2024-01-06 NOTE — PROGRESS NOTES
Subjective:   Patient ID: Chanel Segura is a 53 year old female.    Pt presents for follow up from the urgent /ER for bronchitis and respiratory issues over the last month. Pt had negative respiratory panel in ER Patient is being treated with various medications and had course of abx. Patient states symptoms are better.   Had CT chest which was unremarkable. Blood work unremarkable.   Pt has had some flushing after taking prednisone.   Has hx of migraines.        History/Other:   Review of Systems   Constitutional:  Negative for fever.   HENT:  Negative for congestion.    Respiratory:  Positive for cough. Negative for shortness of breath and wheezing.    Skin:  Negative for rash.   Neurological:  Negative for dizziness and headaches (hx of migraines).   Psychiatric/Behavioral:  Negative for dysphoric mood. The patient is not nervous/anxious.      Current Outpatient Medications   Medication Sig Dispense Refill    guaiFENesin  MG Oral Tablet 12 Hr Take 2 tablets (1,200 mg total) by mouth 2 (two) times daily for 7 days. 28 tablet 0    predniSONE 50 MG Oral Tab Take 1 tablet (50 mg total) by mouth daily for 5 days. 5 tablet 0    fluconazole (DIFLUCAN) 150 MG Oral Tab Take 1 tablet (150 mg total) by mouth daily. 2 tablet 0    guaiFENesin-codeine (CHERATUSSIN AC) 100-10 MG/5ML Oral Solution Take 5 mL by mouth every 6 (six) hours as needed. 118 mL 0    progesterone (PROMETRIUM) 100 MG Oral Cap Take 1 capsule (100 mg total) by mouth nightly. 90 capsule 1    JUAN M 0.05 MG/24HR Transdermal Patch Biweekly Place 1 patch onto the skin twice a week. 24 each 1    NURTEC 75 MG Oral Tablet Dispersible Take 1 tablet by mouth daily. 30 tablet 11    clonazePAM 0.5 MG Oral Tab Take 1 tablet (0.5 mg total) by mouth 2 (two) times daily as needed for Anxiety. 60 tablet 0    nortriptyline 10 MG Oral Cap Take 1 capsule (10 mg total) by mouth nightly.      phenazopyridine 200 MG Oral Tab       cetirizine 10 MG Oral Tab Take 1 tablet  (10 mg total) by mouth daily.      Ibuprofen (ADVIL OR) Take by mouth.      Meth-Hyo-M Bl-Na Phos-Ph Sal (URIBEL OR) Take by mouth.      Melatonin-Pyridoxine (MELATIN OR) Take by mouth.       Allergies:  Allergies   Allergen Reactions    Levaquin [Levofloxacin] RASH     Pt also reports RACING HEART    Compazine [Prochlorperazine]     Sulfanilamide SHORTNESS OF BREATH       Objective:   Physical Exam  Vitals reviewed.   Constitutional:       Appearance: Normal appearance. She is well-developed.   HENT:      Right Ear: Tympanic membrane and ear canal normal.      Left Ear: Tympanic membrane and ear canal normal.      Mouth/Throat:      Mouth: Mucous membranes are moist.      Pharynx: No oropharyngeal exudate or posterior oropharyngeal erythema.   Cardiovascular:      Rate and Rhythm: Normal rate and regular rhythm.      Heart sounds: Normal heart sounds.   Pulmonary:      Effort: Pulmonary effort is normal. No respiratory distress.      Breath sounds: Normal breath sounds. No wheezing or rales.   Musculoskeletal:      Cervical back: Normal range of motion and neck supple.   Lymphadenopathy:      Cervical: No cervical adenopathy.   Neurological:      Mental Status: She is alert.         Assessment & Plan:   1. Bronchitis: improving: reviewed ER notes and testing  - After discussion with patient, to monitor for symptoms and call if any significant symptoms; can continue medications from ER if no sig issues or side effects. Follow up as needed.        No orders of the defined types were placed in this encounter.      Meds This Visit:  Requested Prescriptions      No prescriptions requested or ordered in this encounter       Imaging & Referrals:  None

## 2024-01-23 ENCOUNTER — TELEPHONE (OUTPATIENT)
Dept: NEUROLOGY | Facility: CLINIC | Age: 54
End: 2024-01-23

## 2024-01-23 DIAGNOSIS — G43.009 MIGRAINE WITHOUT AURA AND WITHOUT STATUS MIGRAINOSUS, NOT INTRACTABLE: ICD-10-CM

## 2024-01-23 NOTE — TELEPHONE ENCOUNTER
PA submitted via CoverMyMeds for [employer].  Will await determination.  Reviewed and electronically signed by: KESHAWN Novak

## 2024-01-25 NOTE — TELEPHONE ENCOUNTER
Received denial for Nurtec due to quantity limit.  The prescription is for 30 tablets for 30 days.  Will await the patient response to see how she takes the Nurtec and we will resubmit the PA if it falls within the quantity limit of 10-15.  Reviewed and electronically signed by: KESHAWN Novak

## 2024-01-26 RX ORDER — RIMEGEPANT SULFATE 75 MG/75MG
1 TABLET, ORALLY DISINTEGRATING ORAL EVERY OTHER DAY
Qty: 15 TABLET | Refills: 11 | Status: SHIPPED | OUTPATIENT
Start: 2024-01-26

## 2024-01-26 NOTE — TELEPHONE ENCOUNTER
PA resubmitted through [facility] for every other day use.    Will await determination

## 2024-01-31 ENCOUNTER — OFFICE VISIT (OUTPATIENT)
Dept: NEUROLOGY | Facility: CLINIC | Age: 54
End: 2024-01-31
Payer: MEDICAID

## 2024-01-31 DIAGNOSIS — G43.719 INTRACTABLE CHRONIC MIGRAINE WITHOUT AURA AND WITHOUT STATUS MIGRAINOSUS: Primary | ICD-10-CM

## 2024-01-31 NOTE — PROGRESS NOTES
Paperwork noting that patient may bear financial responsibility for procedure(s) performed in clinic today signed prior to proceeding with procedure(s).    Furthermore, patient notified that they should contact their insurer to verify that your procedure/test has been approved and is a COVERED benefit.  Although the [facility] staff does its due diligence, the insurance carrier gives the disclaimer that \"Although the procedure is authorized, this does not guarantee payment.\"    Ultimately the patient is responsible for payment.    Botox is:  [x] Buy and Bill  [] Patient Supplied    Patient requested that we use only 100 units  of Botox this treatment will return to 200 units for her next treatment.     Botox Reauthorization Questions:  Has the patient experienced a reduction in frequency of migraines since starting Botox? yes  If yes, by what percentage? 50%  Has the intensity of migraines decreased since starting Botox? yes  If yes, by what percentage? 50%    Patient requested that we use only 100 units of Botox this treatment will return to 200 units for her next treatment

## 2024-01-31 NOTE — PROCEDURES
PROCEDURE: Botox Injections (MIGRAINE PROTOCOL)   PRE-OPERATIVE DIAGNOSIS: Chronic Migraine  POST-OPERATIVE DIAGNOSIS: same as above   BLOOD LOSS: minimal COMPLICATIONS: none     DESCRIPTION OF PROCEDURE: After a discussion of the risks and benefits, the patient consented to the procedure. The patient was taken to the procedure room. The upper back, neck, and occipital area was prepped with alcohol swabs. The 2 Botox vials containing 100 units each, were reconstituted with saline.     There are 31 distinct targets in the standard protocol.    Following muscles and units were injected:     10 units divided between 2 sites.    Procerus 5 units in 1 site.    Frontalis 20 units divided between 4 sites.  Additional 4 sites along the hairline, extra 10 units.    Temporalis was skipped.    Occipitalis 40 units divided between 8 sites.    Cervical paraspinal were skipped.    Trapezius was skipped.    Patient tolerated the procedure well.    Total of 85 Units of botulinum toxin were used and 15 Units were wasted.

## 2024-03-06 ENCOUNTER — TELEPHONE (OUTPATIENT)
Dept: NEUROLOGY | Facility: CLINIC | Age: 54
End: 2024-03-06

## 2024-03-06 NOTE — TELEPHONE ENCOUNTER
Received approval for Nurtec 75mg for 54 tablets per 90 days  Approval granted 3/3/24-3/3/25  Form has been sent for scanning.  Reviewed and electronically signed by: KESHAWN Novak

## 2024-03-06 NOTE — TELEPHONE ENCOUNTER
PA submitted via CoverMyMeds for Nurtec 75mg.  Will await determination.  Reviewed and electronically signed by: KESHAWN Novak

## 2024-03-25 ENCOUNTER — TELEPHONE (OUTPATIENT)
Dept: PHYSICAL MEDICINE AND REHAB | Facility: CLINIC | Age: 54
End: 2024-03-25

## 2024-03-25 NOTE — TELEPHONE ENCOUNTER
Initiated authorization for Botox CPT/\[facility] [place] [facility]\"" , [address] dx:G43.719 with [employer]  Case #[id number]  Status: Approved w/ authorization #[id number] valid 3/25/24-5/1/2025      Insurance: [employer] Community Member ID# [id number]  Medication: Botox 200 units  Number of visits Approved: 4 this will be 1 of 4  Dx: G43.719   Last Botox done: 1/31/24  Next Botox due around: 5/1/24  Authorization #[id number]  Valid 3/25/24-5/1/2025  BUY&BILL

## 2024-04-25 ENCOUNTER — TELEPHONE (OUTPATIENT)
Dept: FAMILY MEDICINE CLINIC | Facility: CLINIC | Age: 54
End: 2024-04-25

## 2024-04-25 ENCOUNTER — NURSE TRIAGE (OUTPATIENT)
Dept: FAMILY MEDICINE CLINIC | Facility: CLINIC | Age: 54
End: 2024-04-25

## 2024-04-25 DIAGNOSIS — Z78.0 MENOPAUSE: Primary | ICD-10-CM

## 2024-04-25 RX ORDER — FLUCONAZOLE 150 MG/1
150 TABLET ORAL DAILY
Qty: 2 TABLET | Refills: 0 | Status: SHIPPED | OUTPATIENT
Start: 2024-04-25

## 2024-04-25 NOTE — TELEPHONE ENCOUNTER
Dr Hawthorne (pod mate -on behalf of Dr Hernandez )=RN pended the diflucan (copied from the medication record ) BUT there was no metronidazole on file, thanks.       Action Requested: Summary for Provider     []  Critical Lab, Recommendations Needed  [x] Need Additional Advice  []   FYI    []   Need Orders  [x] Need Medications Sent to Pharmacy  []  Other     SUMMARY: Vaginal burning and whitish thick discharges  x 2 weeks now, tried over the counter one day monistat, she is currently at work until 8 pm and she will be going out of town tomorrow.Offered appointment but she  is just  requesting for the diflucan and possible metronidazole, preferred pharmacy verified. She could schedule an appointment BUT  NOT until next week.         Reason for call: Yeast Infection  Onset: 2 weeks     Informed that DR Hernandez is not in the office today but will send the request to one of her associates.   Per patient,usually her OB was the one who is prescribing her the medications due to HX frequent yeast infection but unfortunately she retired and she does not have any OB yet .        Reason for Disposition   Symptoms of a yeast infection' (i.e., itchy, white discharge, not bad smelling) and not improved > 3 days following Care Advice    Protocols used: Vaginal [id number]-E-OD

## 2024-04-25 NOTE — TELEPHONE ENCOUNTER
Pt stated her OB Dr ordered a Dexa scan but have now retired , requesting order from PCP prior to appt 5/21/24 , aware Dr returns 5/6/24  Please advise

## 2024-04-25 NOTE — TELEPHONE ENCOUNTER
Message noted. May start diflucan as requested. Erx sent to listed pharmacy. To follow up for appointment if not better; Please notify patient

## 2024-05-08 ENCOUNTER — OFFICE VISIT (OUTPATIENT)
Dept: NEUROLOGY | Facility: CLINIC | Age: 54
End: 2024-05-08
Payer: MEDICAID

## 2024-05-08 ENCOUNTER — MED REC SCAN ONLY (OUTPATIENT)
Dept: NEUROLOGY | Facility: CLINIC | Age: 54
End: 2024-05-08

## 2024-05-08 DIAGNOSIS — G43.009 MIGRAINE WITHOUT AURA AND WITHOUT STATUS MIGRAINOSUS, NOT INTRACTABLE: Primary | ICD-10-CM

## 2024-05-08 DIAGNOSIS — G43.719 INTRACTABLE CHRONIC MIGRAINE WITHOUT AURA AND WITHOUT STATUS MIGRAINOSUS: ICD-10-CM

## 2024-05-08 RX ORDER — RIMEGEPANT SULFATE 75 MG/75MG
1 TABLET, ORALLY DISINTEGRATING ORAL EVERY OTHER DAY
Qty: 15 TABLET | Refills: 11 | Status: SHIPPED | OUTPATIENT
Start: 2024-05-08

## 2024-05-08 NOTE — PROGRESS NOTES
Paperwork noting that patient may bear financial responsibility for procedure(s) performed in clinic today signed prior to proceeding with procedure(s).    Furthermore, patient notified that they should contact their insurer to verify that your procedure/test has been approved and is a COVERED benefit.  Although the [facility] staff does its due diligence, the insurance carrier gives the disclaimer that \"Although the procedure is authorized, this does not guarantee payment.\"    Ultimately the patient is responsible for payment.    Botox is:  [x] Buy and Bill  [] Patient Supplied      Botox Reauthorization Questions:  BOTOX 100U  Has the patient experienced a reduction in frequency of migraines since starting Botox? yes  If yes, by what percentage? 75%  Has the intensity of migraines decreased since starting Botox? yes  If yes, by what percentage? 75%    [x] I have discussed with patient that if their insurance changes they must contact the office right away with that information so that a new prior authorization can be completed.  Patient verbalized understanding that Botox cannot be performed without a current prior authorization in place with correct insurance.

## 2024-05-08 NOTE — PROCEDURES
PROCEDURE: Botox Injections (MIGRAINE PROTOCOL)   PRE-OPERATIVE DIAGNOSIS: Chronic Migraine  POST-OPERATIVE DIAGNOSIS: same as above   BLOOD LOSS: minimal COMPLICATIONS: none     DESCRIPTION OF PROCEDURE: After a discussion of the risks and benefits, the patient consented to the procedure. The patient was taken to the procedure room. The upper back, neck, and occipital area was prepped with alcohol swabs. The 2 Botox vials containing 100 units each, were reconstituted with saline.     There are 31 distinct targets in the standard protocol.    Following muscles and units were injected:     10 units divided between 2 sites.    Procerus 5 units in 1 site.    Frontalis 20 units divided between 4 sites.  Additional 4 sites along the hairline, extra 10 units.    Temporalis was skipped.    Occipitalis 40 units divided between 8 sites.    Cervical paraspinal were skipped.    Trapezius was skipped.    Patient tolerated the procedure well.    Total of 85 Units of botulinum toxin were used and 15 Units were wasted.    Lesser dose of Botox worked well, she denied discomfort and trouble with chewing and neck achiness.  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

## 2024-05-14 ENCOUNTER — HOSPITAL ENCOUNTER (OUTPATIENT)
Dept: BONE DENSITY | Facility: HOSPITAL | Age: 54
Discharge: HOME | End: 2024-05-14
Attending: FAMILY MEDICINE

## 2024-05-14 DIAGNOSIS — Z78.0 MENOPAUSE: ICD-10-CM

## 2024-05-14 PROCEDURE — 77080 DXA BONE DENSITY AXIAL: CPT | Performed by: FAMILY MEDICINE

## 2024-05-21 ENCOUNTER — OFFICE VISIT (OUTPATIENT)
Dept: FAMILY MEDICINE CLINIC | Facility: CLINIC | Age: 54
End: 2024-05-21

## 2024-05-21 VITALS
BODY MASS INDEX: 18.53 KG/M2 | RESPIRATION RATE: 20 BRPM | HEIGHT: 60 IN | DIASTOLIC BLOOD PRESSURE: 68 MMHG | OXYGEN SATURATION: 98 % | WEIGHT: 94.38 LBS | HEART RATE: 82 BPM | SYSTOLIC BLOOD PRESSURE: 96 MMHG

## 2024-05-21 DIAGNOSIS — M81.0 OSTEOPOROSIS, UNSPECIFIED OSTEOPOROSIS TYPE, UNSPECIFIED PATHOLOGICAL FRACTURE PRESENCE: Primary | ICD-10-CM

## 2024-05-21 DIAGNOSIS — L98.9 SKIN LESION: ICD-10-CM

## 2024-05-21 DIAGNOSIS — E78.5 HYPERLIPIDEMIA, UNSPECIFIED HYPERLIPIDEMIA TYPE: ICD-10-CM

## 2024-05-21 DIAGNOSIS — E56.9 HYPOVITAMINOSIS: ICD-10-CM

## 2024-05-21 DIAGNOSIS — R05.9 COUGH, UNSPECIFIED TYPE: ICD-10-CM

## 2024-05-21 PROCEDURE — 99214 OFFICE O/P EST MOD 30 MIN: CPT | Performed by: FAMILY MEDICINE

## 2024-05-21 NOTE — PROGRESS NOTES
Subjective:   Patient ID: Chanel Segura is a 54 year old female.    HPI  Patient here for multiple medical issues   One - she is coughing up pelets yellowish kid for months   She had ct scan which was not diagnostic   She is not losing weight no shortness of breath   Also has a lot of sunrelated skin lesions   Dexa scan was abnormal   Also has a lot joint and bone pain     History/Other:   Review of Systems    Constitutional: feels tired  Respiratory: see hpi   Cardiovascular: Negative.  Negative for chest pain, palpitations and leg swelling.   Gastrointestinal: Negative.  Negative for abdominal pain.   Skin: see hpi     MSK see hpi      Psychiatric/Behavioral: Negative.        Current Outpatient Medications   Medication Sig Dispense Refill    NURTEC 75 MG Oral Tablet Dispersible Take 1 tablet by mouth every other day. 15 tablet 11    progesterone (PROMETRIUM) 100 MG Oral Cap Take 1 capsule (100 mg total) by mouth nightly. 90 capsule 1    JUAN M 0.05 MG/24HR Transdermal Patch Biweekly Place 1 patch onto the skin twice a week. 24 each 1    cetirizine 10 MG Oral Tab Take 1 tablet (10 mg total) by mouth daily.      Ibuprofen (ADVIL OR) Take by mouth.      Melatonin-Pyridoxine (MELATIN OR) Take by mouth.       Allergies:  Allergies   Allergen Reactions    Levaquin [Levofloxacin] RASH     Pt also reports RACING HEART    Compazine [Prochlorperazine]     Sulfanilamide SHORTNESS OF BREATH       Objective:   Physical Exam  Constitutional:       Appearance: She is well-developed.   Cardiovascular:      Rate and Rhythm: Normal rate and regular rhythm.      Heart sounds: Normal heart sounds.   Pulmonary:      Effort: Pulmonary effort is normal.      Breath sounds: Normal breath sounds.   Abdominal:      General: Bowel sounds are normal.      Palpations: Abdomen is soft.   Skin:     General: Skin is warm and dry.   Neurological:      Mental Status: She is alert.      Deep Tendon Reflexes: Reflexes are normal and symmetric.          Assessment & Plan:   1. Osteoporosis, unspecified osteoporosis type, unspecified pathological fracture presence    2. Cough, unspecified type    3. Hyperlipidemia, unspecified hyperlipidemia type    4. Skin lesion    5. Hypovitaminosis    Appropriate referrals done as patient needs to be assessed by specialists     Orders Placed This Encounter   Procedures    Lipid Panel    Hepatic Function Panel (7) [E]    Vitamin D [E]       Meds This Visit:  Requested Prescriptions      No prescriptions requested or ordered in this encounter       Imaging & Referrals:  ENDOCRINOLOGY - INTERNAL  PULMONARY - INTERNAL  DERM - INTERNAL

## 2024-07-23 DIAGNOSIS — Z78.0 MENOPAUSE: Primary | ICD-10-CM

## 2024-07-23 DIAGNOSIS — Z79.890 PERSONAL HISTORY OF ONGOING TREATMENT WITH HORMONAL THERAPY: ICD-10-CM

## 2024-07-23 RX ORDER — PROGESTERONE 100 MG/1
100 CAPSULE ORAL NIGHTLY
Qty: 90 CAPSULE | Refills: 3 | OUTPATIENT
Start: 2024-07-23

## 2024-07-23 NOTE — TELEPHONE ENCOUNTER
Please review. Protocol failed / No protocol.    Last Pap 10/19/23 - Negative. Physical 8/9/23.    Requested Prescriptions   Pending Prescriptions Disp Refills    PROGESTERONE 100 MG Oral Cap [Pharmacy Med Name: Progesterone 100 Mg Cap Nort] 90 capsule 0     Sig: Take 1 capsule (100 mg total) by mouth nightly.       Gynecology Medication Protocol Failed - 7/23/2024  4:19 AM        Failed - PASS--PENDING LAST PAP WNL--VIA MANUAL LOOKUP        Failed - Mammogram in past 12 months        Passed - Physical or Pelvic/Breast in past 12 or next 3 mos--VIA MANUAL LOOKUP             Recent Outpatient Visits              2 months ago Osteoporosis, unspecified osteoporosis type, unspecified pathological fracture presence    [facility] Pia Hernandez MD    Office Visit    2 months ago Migraine without aura and without status migrainosus, not intractable    [facility]    5 months ago Intractable chronic migraine without aura and without status migrainosus    [facility]    [facility] Visit    6 months ago Bronchitis    [facility] Teofilo Hawthorne MD    Office Visit    10 months ago Intractable chronic migraine without aura and without status migrainosus    [facility]            Future Appointments         Provider Department Appt Notes    In 1 week Brit Nguyễn MD [facility] Dx: Skin lesion (L98.9)   Signed Referral [place]:    Number of Visits: 3    In 2 weeks Shahzad Robles MD [facility] botox nsurance: [employer] Community Member ID# [id number]  Medication: Botox 200 units  Number of visits Approved: 4 this will be 2 of  4  Dx: G43.719   Last Botox done: 1/31/24  Next Botox due around: 5/1/24  Authorization #[id number]  Valid 3/25/24-5/1/2025    In 3 weeks Tere Ballesteros MD [facility] osteo  informed to bring id/ins    In 2 months Mendy Farias DO [facility] cough, informed to wear mask if active cough  informed to bring id/ins

## 2024-07-23 NOTE — TELEPHONE ENCOUNTER
See telephone encounter from 10/20/23, patient was advised to take medication by previous OB/Gyne   Recommend patient re-establish care with  OB/Gyne provider to determine if medication still needed. Referral placed

## 2024-08-02 VITALS
DIASTOLIC BLOOD PRESSURE: 77 MMHG | RESPIRATION RATE: 18 BRPM | WEIGHT: 95 LBS | BODY MASS INDEX: 18.65 KG/M2 | TEMPERATURE: 98 F | SYSTOLIC BLOOD PRESSURE: 125 MMHG | OXYGEN SATURATION: 100 % | HEIGHT: 60 IN | HEART RATE: 60 BPM

## 2024-08-02 PROCEDURE — 99283 EMERGENCY DEPT VISIT LOW MDM: CPT

## 2024-08-02 RX ORDER — METRONIDAZOLE 7.5 MG/G
1 GEL VAGINAL NIGHTLY
COMMUNITY

## 2024-08-03 ENCOUNTER — HOSPITAL ENCOUNTER (EMERGENCY)
Facility: HOSPITAL | Age: 54
Discharge: HOME | End: 2024-08-03
Attending: STUDENT IN AN ORGANIZED HEALTH CARE EDUCATION/TRAINING PROGRAM
Payer: MEDICAID

## 2024-08-03 DIAGNOSIS — T78.40XA ALLERGIC REACTION, INITIAL ENCOUNTER: Primary | ICD-10-CM

## 2024-08-03 RX ORDER — PREDNISONE 20 MG/1
40 TABLET ORAL DAILY
Qty: 8 TABLET | Refills: 0 | Status: SHIPPED | OUTPATIENT
Start: 2024-08-04 | End: 2024-08-08

## 2024-08-03 RX ORDER — PREDNISONE 20 MG/1
40 TABLET ORAL ONCE
Status: COMPLETED | OUTPATIENT
Start: 2024-08-03 | End: 2024-08-03

## 2024-08-03 RX ORDER — EPINEPHRINE 0.3 MG/.3ML
0.3 INJECTION SUBCUTANEOUS
Qty: 1 EACH | Refills: 0 | Status: SHIPPED | OUTPATIENT
Start: 2024-08-03 | End: 2024-09-02

## 2024-08-03 RX ORDER — DIPHENHYDRAMINE HCL 25 MG
25 CAPSULE ORAL ONCE
Status: COMPLETED | OUTPATIENT
Start: 2024-08-03 | End: 2024-08-03

## 2024-08-03 NOTE — ED INITIAL ASSESSMENT (HPI)
Pt presents stating that she was recently on a cruise, notice some green vaginal discharged, called her doctor and was started on a medication that she has taken in the past on Monday.  On Tuesday she started noticing bumps on her body, tingling to her lips and feels like she has swelling to her tongue.

## 2024-08-03 NOTE — ED PROVIDER NOTES
Patient Seen in: [facility] Emergency Department      History     Chief Complaint   Patient presents with    Allergic Rxn Allergies     Stated Complaint: Allergic Rx to Meds/    Subjective:   HPI    54-year-old female with history of endometriosis, allergic rhinitis, presenting for concern for allergic reaction.  States was recently traveling abroad, developed vaginal discharge for which she was prescribed metronidazole gel.  She used this medication with good relief of her symptoms but then developed an itchy rash on chest and arms.  Describes some tingling in the mouth.  No lip or throat swelling no trouble breathing.  Also feels somewhat dizzy.  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    Objective:   Past Medical History:    Allergic rhinitis    Endometriosis    Hypoglycemia    Kidney stones    UTI (urinary tract infection)              Past Surgical History:   Procedure Laterality Date    Cholecystectomy      Other surgical history      laparscopy for endometriosis x 5    Other surgical history      oral surgery                Social History     Socioeconomic History    Marital status:    Tobacco Use    Smoking status: Never    Smokeless tobacco: Never   Vaping Use    Vaping status: Never Used   Substance and Sexual Activity    Alcohol use: Not Currently     Alcohol/week: 1.0 standard drink of alcohol     Types: 1 Glasses of wine per week    Drug use: No   Other Topics Concern    Caffeine Concern Yes     Comment: tea 3 cups    Exercise No    Seat Belt No    Special Diet No    Stress Concern No    Weight Concern No     Social Determinants of Health      Received [facility]              Review of Systems    Positive for stated Chief Complaint: Allergic Rxn Allergies    Other systems are as noted in HPI.  Constitutional and vital signs reviewed.      All other systems reviewed  and negative except as noted above.    Physical Exam     ED Triage Vitals [08/02/24 2357]   /77   Pulse 60   Resp 18   Temp 97.9 °F (36.6 °C)   Temp src Oral   SpO2 100 %   O2 Device        Current Vitals:   Vital Signs  BP: 125/77  Pulse: 60  Resp: 18  Temp: 97.9 °F (36.6 °C)  Temp src: Oral    Oxygen Therapy  SpO2: 100 %            Physical Exam    Constitutional: awake, alert, no sig distress  HENT: mmm, no lesions,  Neck: normal range of motion, no tenderness, supple.  Eyes: PERRL, EOMI, conjunctiva normal, no discharge. Sclera anicteric.  Cardiovascular: rr no murmur  Respiratory: Normal breath sounds, no respiratory distress, no wheezing, no chest tenderness.  GI: Bowel sounds normal, Soft, no tenderness, no masses, no pulsatile masses.  : No CVA tenderness.  Skin: Warm, dry, no erythema, scattered papular rash involving chest and arms and back.  Musculoskeletal: Intact distal pulses, no edema, no tenderness, no cyanosis, no clubbing. Good range of motion in all major joints. No tenderness to palpation or major deformities noted. Back- No tenderness.  Neurologic: Alert & oriented x 3, normal motor function, normal sensory function, no focal deficits noted.  Psych: Calm, cooperative, nl affect        ED Course   Labs Reviewed - No data to display                   MDM      54-year-old female presenting with possible allergic reaction to metronidazole.  On arrival vitals are stable and reassuring.  No evidence of anaphylaxis or anaphylactic shock.  DDx includes allergic reaction, folliculitis, eczema  Plan for discharge and steroids, Benadryl and close PMD follow-up.  Return precautions and follow-up instructions were discussed with patient who voiced understanding and agreement the plan.  All questions were answered to patient satisfaction.                               Medical Decision Making      Disposition and Plan     Clinical Impression:  1. Allergic reaction, initial encounter          Disposition:  Discharge  8/3/2024  1:22 am    Follow-up:  Pia Hernandez MD  [address] [facility] [id number] [phone]    Follow up in 2 day(s)      [facility] Emergency Department  [address] [facility] [id number] [phone]  Follow up  As needed, If symptoms worsen          Medications Prescribed:  Discharge Medication List as of 8/3/2024  1:33 AM        START taking these medications    Details   predniSONE 20 MG Oral Tab Take 2 tablets (40 mg total) by mouth daily for 4 days., Normal, Disp-8 tablet, R-0      EPINEPHrine 0.3 MG/0.3ML Injection Solution Auto-injector Inject 0.3 mL (1 each total) into the muscle daily as needed., Normal, Disp-1 each, R-0

## 2024-08-03 NOTE — DISCHARGE INSTRUCTIONS
Call 911  Call 911 right away if any of these occur:   Shortness of breath  Cool, moist, pale skin  Swelling in the face, eyelids, mouth, throat, tongue, or lips  Drooling  Trouble breathing or swallowing, wheezing     Hoarse voice or trouble speaking   Fainting or loss of consciousness  Rapid heart rate  Feeling of dizziness or weakness or a sudden drop in blood pressure  Feeling of doom  Feeling lightheaded  Severe stomach pain, nausea, vomiting, or diarrhea  When to get medical advice  Call your healthcare provider or get medical care right away if any of these occur:   Symptoms that last, get worse, or go away and come back  Nausea, belly cramps, or stomach pain  Spreading areas of itching, redness, or swelling  Skin blisters, or sores or ulcers in the mouth or on the genitals  Signs of infection:  Spreading redness  Increased pain or swelling  Fever of 100.4°F (38°C) or higher lasting for 24 to 48 hours, or as directed by your provider  Fluid or colored drainage from the affected area  Michael last reviewed this educational content on 8/1/2022

## 2024-08-05 ENCOUNTER — OFFICE VISIT (OUTPATIENT)
Dept: DERMATOLOGY CLINIC | Facility: CLINIC | Age: 54
End: 2024-08-05
Payer: MEDICAID

## 2024-08-05 DIAGNOSIS — D22.9 MULTIPLE NEVI: ICD-10-CM

## 2024-08-05 DIAGNOSIS — L81.4 LENTIGO: ICD-10-CM

## 2024-08-05 DIAGNOSIS — L82.1 SK (SEBORRHEIC KERATOSIS): Primary | ICD-10-CM

## 2024-08-05 DIAGNOSIS — D23.9 BENIGN NEOPLASM OF SKIN, UNSPECIFIED LOCATION: ICD-10-CM

## 2024-08-05 DIAGNOSIS — L30.9 DERMATITIS: ICD-10-CM

## 2024-08-05 PROCEDURE — 99203 OFFICE O/P NEW LOW 30 MIN: CPT | Performed by: DERMATOLOGY

## 2024-08-05 RX ORDER — PREDNISONE 10 MG/1
TABLET ORAL
Qty: 30 TABLET | Refills: 0 | Status: SHIPPED | OUTPATIENT
Start: 2024-08-05 | End: 2024-08-17

## 2024-08-05 RX ORDER — CLOBETASOL PROPIONATE 0.5 MG/G
1 CREAM TOPICAL 2 TIMES DAILY
Qty: 60 G | Refills: 3 | Status: SHIPPED | OUTPATIENT
Start: 2024-08-05 | End: 2025-08-05

## 2024-08-08 ENCOUNTER — MED REC SCAN ONLY (OUTPATIENT)
Dept: NEUROLOGY | Facility: CLINIC | Age: 54
End: 2024-08-08

## 2024-08-08 ENCOUNTER — OFFICE VISIT (OUTPATIENT)
Dept: NEUROLOGY | Facility: CLINIC | Age: 54
End: 2024-08-08
Payer: MEDICAID

## 2024-08-08 DIAGNOSIS — G43.719 INTRACTABLE CHRONIC MIGRAINE WITHOUT AURA AND WITHOUT STATUS MIGRAINOSUS: Primary | ICD-10-CM

## 2024-08-08 PROCEDURE — 64615 CHEMODENERV MUSC MIGRAINE: CPT | Performed by: OTHER

## 2024-08-08 RX ORDER — GALCANEZUMAB 120 MG/ML
120 INJECTION, SOLUTION SUBCUTANEOUS
Qty: 1 EACH | Refills: 11 | Status: SHIPPED | OUTPATIENT
Start: 2024-08-08 | End: 2025-08-08

## 2024-08-08 NOTE — PROCEDURES
PROCEDURE: Botox Injections (MIGRAINE PROTOCOL)   PRE-OPERATIVE DIAGNOSIS: Chronic Migraine  POST-OPERATIVE DIAGNOSIS: same as above   BLOOD LOSS: minimal COMPLICATIONS: none     DESCRIPTION OF PROCEDURE: After a discussion of the risks and benefits, the patient consented to the procedure. The patient was taken to the procedure room. The upper back, neck, and occipital area was prepped with alcohol swabs. The 2 Botox vials containing 100 units each, were reconstituted with saline.     There are 31 distinct targets in the standard protocol.    Following muscles and units were injected:     10 units divided between 2 sites.    Procerus 5 units in 1 site.    Frontalis was skipped.    Temporalis was skipped.    Occipitalis 40 units divided between 8 sites.    Cervical paraspinal were skipped.    Trapezius was skipped.    Patient tolerated the procedure well.    Total of 55 Units of botulinum toxin were used and 45 Units were wasted.    Lesser dose of Botox worked well, she denied discomfort and trouble with chewing and neck achiness.  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  Therefore Emgality will be tried.

## 2024-08-08 NOTE — PROGRESS NOTES
Paperwork noting that patient may bear financial responsibility for procedure(s) performed in clinic today signed prior to proceeding with procedure(s).    Furthermore, patient notified that they should contact their insurer to verify that your procedure/test has been approved and is a COVERED benefit.  Although the [facility] staff does its due diligence, the insurance carrier gives the disclaimer that \"Although the procedure is authorized, this does not guarantee payment.\"    Ultimately the patient is responsible for payment.    Botox is:  [x] Buy and Bill  [] Patient Supplied      Botox Reauthorization Questions:  Has the patient experienced a reduction in frequency of migraines since starting Botox? yes  If yes, by what percentage? 75%  Has the intensity of migraines decreased since starting Botox? yes  If yes, by what percentage? 75%    [x] I have discussed with patient that if their insurance changes they must contact the office right away with that information so that a new prior authorization can be completed.  Patient verbalized understanding that Botox cannot be performed without a current prior authorization in place with correct insurance.

## 2024-08-14 ENCOUNTER — OFFICE VISIT (OUTPATIENT)
Dept: ENDOCRINOLOGY CLINIC | Facility: CLINIC | Age: 54
End: 2024-08-14
Payer: MEDICAID

## 2024-08-14 VITALS
BODY MASS INDEX: 18.4 KG/M2 | WEIGHT: 93.69 LBS | HEIGHT: 60 IN | SYSTOLIC BLOOD PRESSURE: 86 MMHG | DIASTOLIC BLOOD PRESSURE: 58 MMHG | HEART RATE: 66 BPM

## 2024-08-14 DIAGNOSIS — M25.50 ARTHRALGIA, UNSPECIFIED JOINT: ICD-10-CM

## 2024-08-14 DIAGNOSIS — M81.8 OTHER OSTEOPOROSIS WITHOUT CURRENT PATHOLOGICAL FRACTURE: ICD-10-CM

## 2024-08-14 DIAGNOSIS — M81.0 AGE-RELATED OSTEOPOROSIS WITHOUT CURRENT PATHOLOGICAL FRACTURE: Primary | ICD-10-CM

## 2024-08-14 PROCEDURE — 99204 OFFICE O/P NEW MOD 45 MIN: CPT | Performed by: INTERNAL MEDICINE

## 2024-08-14 NOTE — H&P
New Patient Evaluation - History and Physical    CONSULT - Reason for Visit:  Evaluation of BMD.  Requesting Physician: Dr. Hernandez.    CHIEF COMPLAINT:    Chief Complaint   Patient presents with    Consult     Est care for osteoporosis dx 5/2024, dexa done 2024   Pt denies any recent falls/fractures/ or dental work          HISTORY OF PRESENT ILLNESS:   Chanel Segura is a 54 year old female who presents with osteopenia.    FRACTURE HISTORY  o Low trauma, atraumatic or high trauma (specifics regarding circumstances of fracture)- Fell on the ice on her lower back and did not have a fracture. She has severe joint pain. She has a strong family history of rheumatoid arthritis and testing for this has come back negative.     o Fall-related fracture and circumstances of fall    o Prior history of fracture(s)     o Site, age at fracture, interventions    o Prior history of osteoporosis    o Prior history of osteoporosis treatment (medication, age or date used, duration of use, pre or postmenopausal ,when used, and reason for discontinuation) None    o Prolonged history of steroids, aromatase inhibitors, anticonvulsants, and other meds that may affect skeleton    o Chronic use vs. intermittent use with dates of usage    o Secondary conditions associated with osteoporosis- she has a history of kidney stones, and cut out dairy    o DXA tests in past ( age or date when performed and results) 5/14/24  PROCEDURE: XR DEXA BONE DENSITOMETRY (CPT=77080)     COMPARISON: [facility], XR DEXA BONE DENSITOMETRY (CPT=77080), 5/10/2019, 11:04 AM.     INDICATIONS: Menopause     TECHNIQUE: Measurement of bone mineral density of the lumbar spine and hip was performed on a Hologic dual energy x-ray absorptiometry scanner.     FINDINGS:     LEFT FEMORAL NECK  BMD: 0.585 gm/sq. cm. T SCORE: -2.4 Z SCORE: -1.3     LEFT TOTAL HIP  BMD: 0.702 gm/sq. cm. T SCORE: -2.0 Z SCORE: -1.2     PA LUMBAR SPINE (L1 - L4)  BMD: 0.824  gm/sq. cm. T SCORE: -2.0 Z SCORE: -0.9        T scores are a comparison to sex-matched patients with mean peak bone mass and are given in standard deviation (s.d.).  Each 1 s.d. corresponds to approximately 10% below peak normal bone density.       WORLD HEALTH ORGANIZATION CRITERIA  NORMAL T SCORE: Above -1 s.d.    OSTEOPENIA T SCORE: Between -1 and -2.5 s.d.    OSTEOPOROSIS T SCORE: -2.5 s.d.     National Osteoporosis Foundation Clinician's Guide to Prevention and Treatment of Osteoporosis recommendations for treatment:  Post menopausal women and men age 50 and older presenting with the following should be considered for treatment:  * A hip or vertebral (clinical or morphometric) fracture  * T score < -2.5 at the femoral neck or spine after appropriate evaluation to exclude secondary causes.  * Low bone mass (T score between -1.0 and -2.5 at the femoral neck or spine) and a 10-year probability of a hip fracture > 3% or a 10-year probability of a major osteoporosis-related fracture > 20% based on the [place]-adapted WHO algorithm              Impression  CONCLUSION:  1. Findings in the left femoral neck suggest osteopenia, there may be increased fracture risk.  There has been increase in the BMD by 2.6% from previous study.  Findings in the total left hip suggest osteopenia, and there may be increased fracture risk.   There has been decrease in the BMD by 0.5% from previous study.  The 10 year fracture risk for major osteoporotic fracture is 26%, and for hip fracture is 2.9%.  2. Findings in the total AP lumbar spine suggest osteopenia, and there may be increased fracture risk.  There has been increase in the BMD by 1.7% from previous study.           Dictated by (CST): Richard Blum MD on 5/14/2024 at 5:39 PM      Finalized by (CST): Richard Blum MD on 5/14/2024 at 5:40 PM          REPRODUCTIVE HISTORY      o Menarche age   10  o Regular/Irregular menses   10  o History of amenorrhea  When she was on Lupron  o G P A  L   (1 miscarriage)  o Breastfeeding  4 weeks  o Contraceptives? Infertility?  none  o Natural menopause age   45   o Hormone therapy (start and stop)  Started when she was 48 to 49  o Concurrent diseases and medications    PAST MEDICAL HISTORY:   Past Medical History:    Acne    Allergic rhinitis    Contact allergic reaction    Endometriosis    Environmental allergies    Headache    Hypoglycemia    Kidney stones    Osteoarthritis    UTI (urinary tract infection)       SURGICAL HISTORY  Past Surgical History:   Procedure Laterality Date    Cholecystectomy      Colonoscopy            Other surgical history      laparscopy for endometriosis x 5    Other surgical history      oral surgery    Tonsillectomy         SOCIAL HISTORY  o Daily calcium intake (food and supplements)  Yes, takes vitamin D, but no calcium due to history of kidney stones  o Daily exercise  Exercise  FAMILY HISTORY   Family History   Problem Relation Age of Onset    Diabetes Father     Other (Other) Father         COPD    Dementia Father     Heart Disorder Mother         CAD and Stroke (CVA)    Hypertension Mother     Arthritis Mother     Pulmonary Disease Mother     Cancer Sister         Lymphoma    Macular degeneration Neg     Glaucoma Neg      Family history of fractures, osteoporosis, osteopenia      CURRENT MEDICATIONS:    Current Outpatient Medications   Medication Sig Dispense Refill    Galcanezumab-gnlm (EMGALITY) 120 MG/ML Subcutaneous Solution Auto-injector Inject 120 mg into the skin every 30 (thirty) days. 1 each 11    clobetasol 0.05 % External Cream Apply 1 Application topically 2 (two) times daily. 60 g 3    predniSONE 10 MG Oral Tab Take po 1 tab 4 times daily x 3 days,then 1 tab 3times daily x 3 days, then 1 tab 2 times daily x 3 days, then 1 tab daily x 3 days 30 tablet 0    EPINEPHrine 0.3 MG/0.3ML Injection Solution Auto-injector Inject 0.3 mL (1 each total) into the muscle daily as needed. 1 each 0    metroNIDAZOLE 0.75  % Vaginal Gel Place 1 Tube vaginally nightly.      NURTEC 75 MG Oral Tablet Dispersible Take 1 tablet by mouth every other day. 15 tablet 11    progesterone (PROMETRIUM) 100 MG Oral Cap Take 1 capsule (100 mg total) by mouth nightly. 90 capsule 1    JUAN M 0.05 MG/24HR Transdermal Patch Biweekly Place 1 patch onto the skin twice a week. 24 each 1    cetirizine 10 MG Oral Tab Take 1 tablet (10 mg total) by mouth daily.      Ibuprofen (ADVIL OR) Take by mouth.      Melatonin-Pyridoxine (MELATIN OR) Take by mouth.         ALLERGIES:  Allergies   Allergen Reactions    Levaquin [Levofloxacin] RASH     Pt also reports RACING HEART    Metronidazole HIVES    Compazine [Prochlorperazine]     Sulfanilamide SHORTNESS OF BREATH         ASSESSMENTS:   PAIN ASSESSMENT: (Patient reports pain) yes    FALL ASSESSMENT: she is stable    FUNCTIONAL ASSESSMENT (Able to perform all ADLs):  yes    REVIEW OF SYSTEMS:  No falls in past 12 months, no loss of height   Constitutional: Negative for: weight change, fever, fatigue, cold/heat intolerance  Eyes: Negative for:  Visual changes, proptosis, blurring  ENT: Negative for:  dysphagia, neck swelling, dysphonia  Respiratory: Negative for:  dyspnea, cough  Cardiovascular: Negative for:  chest pain, palpitations, orthopnea  GI: Negative for:  abdominal pain, nausea, vomiting, diarrhea, constipation, bleeding  Neurology: Negative for: headache, numbness, weakness  Genito-Urinary: Negative for: dysuria, frequency  Psychiatric: Negative for:  depression, anxiety  Hematology/Lymphatics: Negative for: bruising, lower extremity edema  Endocrine: Negative for: polyuria, polydypsia  Skin: Negative for: rash, blister, cellulitis,      PHYSICAL EXAM:   Vitals:    08/14/24 1144   BP: (!) 86/58   Pulse: 66   Weight: 93 lb 11.2 oz (42.5 kg)   Height: 5' (1.524 m)     BMI: Body mass index is 18.3 kg/m².     CONSTITUTIONAL:  awake, alert, cooperative, no apparent distress, and appears stated age  PSYCH:  normal affect  ABDOMEN:  normal bowel sounds, soft, non-distended, non-tender  SKIN:  no bruising or bleeding, no rashes and no lesions  EXTREMITIES:normal pulses, no edema      DATA:   CMP:    Lab Results   Component Value Date     01/03/2024    K 4.0 01/03/2024     01/03/2024    CO2 30.0 01/03/2024    BUN 15 01/03/2024    GLU 93 01/03/2024    ALB 3.5 09/25/2023    CA 9.6 01/03/2024     FLP (Lipid Profile):    Lab Results   Component Value Date    TRIG 89 09/25/2023    HDL 70 (H) 09/25/2023     LDL direct : No components found for: \"LDLDIRECT\"  Microalbumin/Creatinine ratio:  No components found for: \"RUCREAT\", \"RUMICROAL\", \"MCRATIO\"  TSH:    Lab Results   Component Value Date    TSH 3.200 09/25/2023         ASSESSMENT AND PLAN: This is a 54 year-old woman here for evaluation and management of osteoporosis that had been identified first in 2019 and has improved slightly since then, presumably due to the use of HRT.     I would like to investigate fully for the possible causes of the initial reason for development of this, but even otherwise, I suspect that a large part has to do with her small frame and decrease in calcium intake.     She will return to see me in a few weeks to go over test results.    Prior to this encounter, I spent over 20 minutes with preparing for the visit, reviewing documents from other specialties as well as from PCP, and going over test results and imaging studies. During the face to face encounter, I spent an additional 30 minutes which were determined for history-taking, physical examination, and orientation regarding our services. Greater than 50% of the time was spent in counseling, anticipatory guidance, and coordination of care. Patient concerns were answered to the best of my knowledge.     8/14/2024  Tere Ballesteros MD

## 2024-08-15 PROBLEM — M81.0 AGE-RELATED OSTEOPOROSIS WITHOUT CURRENT PATHOLOGICAL FRACTURE: Status: ACTIVE | Noted: 2024-08-15

## 2024-08-15 PROBLEM — M81.8 OTHER OSTEOPOROSIS WITHOUT CURRENT PATHOLOGICAL FRACTURE: Status: ACTIVE | Noted: 2024-08-15

## 2024-08-18 NOTE — PROGRESS NOTES
Chanel Segura is a 54 year old female.  HPI:     CC:    Chief Complaint   Patient presents with    Lesion     New pt present with a lesion of concern on her R thigh. Spot is a bit raised and dark. Pt denies any hx of skin CA.    Derm Problem     Pt went on a cruise to [place], [place] and [place]. Pt has came back and a week ago she noticed a rash on her chest, stomach, neck, scalp, armpits, lips and tongue. Pt states these spots sting and burn. Pt was given predniSONE 20 MG and EPINEPHrine from ER but has not started anything yet, she wanted to come to her appointment before. Possible allergic reaction to Metronidazole.     Squamous Cell Carcinoma           HISTORY:    Past Medical History:    Acne    Allergic rhinitis    Contact allergic reaction    Endometriosis    Environmental allergies    Headache    Hypoglycemia    Kidney stones    Osteoarthritis    UTI (urinary tract infection)      Past Surgical History:   Procedure Laterality Date    Cholecystectomy      Colonoscopy            Other surgical history      laparscopy for endometriosis x 5    Other surgical history      oral surgery    Tonsillectomy        Family History   Problem Relation Age of Onset    Diabetes Father     Other (Other) Father         COPD    Dementia Father     Heart Disorder Mother         CAD and Stroke (CVA)    Hypertension Mother     Arthritis Mother     Pulmonary Disease Mother     Cancer Sister         Lymphoma    Macular degeneration Neg     Glaucoma Neg       Social History     Socioeconomic History    Marital status:    Tobacco Use    Smoking status: Never    Smokeless tobacco: Never   Vaping Use    Vaping status: Never Used   Substance and Sexual Activity    Alcohol use: Not Currently     Alcohol/week: 1.0 standard drink of alcohol     Types: 1 Glasses of wine per week    Drug use: Never   Other Topics Concern    Caffeine Concern Yes     Comment: tea 3 cups    Exercise No    Seat Belt No    Special Diet No    Stress  Concern No    Weight Concern No    Grew up on a farm No    History of tanning Yes    Outdoor occupation No    Breast feeding No    Reaction to local anesthetic Yes    Pt has a pacemaker No    Pt has a defibrillator No     Social Determinants of Health      Received from [facility]        Current Outpatient Medications   Medication Sig Dispense Refill    clobetasol 0.05 % External Cream Apply 1 Application topically 2 (two) times daily. 60 g 3    predniSONE 10 MG Oral Tab Take po 1 tab 4 times daily x 3 days,then 1 tab 3times daily x 3 days, then 1 tab 2 times daily x 3 days, then 1 tab daily x 3 days 30 tablet 0    EPINEPHrine 0.3 MG/0.3ML Injection Solution Auto-injector Inject 0.3 mL (1 each total) into the muscle daily as needed. 1 each 0    metroNIDAZOLE 0.75 % Vaginal Gel Place 1 Tube vaginally nightly.      NURTEC 75 MG Oral Tablet Dispersible Take 1 tablet by mouth every other day. 15 tablet 11    progesterone (PROMETRIUM) 100 MG Oral Cap Take 1 capsule (100 mg total) by mouth nightly. 90 capsule 1    JUAN M 0.05 MG/24HR Transdermal Patch Biweekly Place 1 patch onto the skin twice a week. 24 each 1    cetirizine 10 MG Oral Tab Take 1 tablet (10 mg total) by mouth daily.      Ibuprofen (ADVIL OR) Take by mouth.      Melatonin-Pyridoxine (MELATIN OR) Take by mouth.      Galcanezumab-gnlm (EMGALITY) 120 MG/ML Subcutaneous Solution Auto-injector Inject 120 mg into the skin every 30 (thirty) days. 1 each 11     Allergies:   Allergies   Allergen Reactions    Levaquin [Levofloxacin] RASH     Pt also reports RACING HEART    Metronidazole HIVES    Compazine [Prochlorperazine]     Sulfanilamide SHORTNESS OF BREATH       Past Medical History:    Acne    Allergic rhinitis    Contact allergic reaction    Endometriosis    Environmental allergies    Headache    Hypoglycemia    Kidney stones    Osteoarthritis    UTI (urinary tract infection)     Past Surgical History:   Procedure Laterality Date     Cholecystectomy      Colonoscopy            Other surgical history      laparscopy for endometriosis x 5    Other surgical history      oral surgery    Tonsillectomy       Social History     Socioeconomic History    Marital status:      Spouse name: Not on file    Number of children: Not on file    Years of education: Not on file    Highest education level: Not on file   Occupational History    Not on file   Tobacco Use    Smoking status: Never    Smokeless tobacco: Never   Vaping Use    Vaping status: Never Used   Substance and Sexual Activity    Alcohol use: Not Currently     Alcohol/week: 1.0 standard drink of alcohol     Types: 1 Glasses of wine per week    Drug use: Never    Sexual activity: Not on file   Other Topics Concern    Caffeine Concern Yes     Comment: tea 3 cups    Exercise No    Seat Belt No    Special Diet No    Stress Concern No    Weight Concern No    Grew up on a farm No    History of tanning Yes    Outdoor occupation No    Breast feeding No    Reaction to local anesthetic Yes    Pt has a pacemaker No    Pt has a defibrillator No   Social History Narrative    Not on file     Social Determinants of Health     Financial Resource Strain: Not on file   Food Insecurity: Not on file   Transportation Needs: Not on file   Physical Activity: Not on file   Stress: Not on file   Social Connections: Not on file   Housing Stability: At Risk (2023)    Received from [facility]     Living Situation: Not on file     Housing Problems: Not on file     Family History   Problem Relation Age of Onset    Diabetes Father     Other (Other) Father         COPD    Dementia Father     Heart Disorder Mother         CAD and Stroke (CVA)    Hypertension Mother     Arthritis Mother     Pulmonary Disease Mother     Cancer Sister         Lymphoma    Macular degeneration Neg     Glaucoma Neg        There were no vitals filed for this visit.    HPI:  Chief Complaint   Patient presents with     Lesion     New pt present with a lesion of concern on her R thigh. Spot is a bit raised and dark. Pt denies any hx of skin CA.    Derm Problem     Pt went on a cruise to [place], [place] and [place]. Pt has came back and a week ago she noticed a rash on her chest, stomach, neck, scalp, armpits, lips and tongue. Pt states these spots sting and burn. Pt was given predniSONE 20 MG and EPINEPHrine from ER but has not started anything yet, she wanted to come to her appointment before. Possible allergic reaction to Metronidazole.     Squamous Cell Carcinoma     New patient concerned with rash, had been on prednisone, epinephrine.  Not started anything had noted stinging irritation.  Had started on patient's return.  Had been out in the yard.  Had noted itching areas.  Patient been coming up in crops.  Nothing else unusual.  Had been fine while away.  Had been on prednisone.  Had noted burning irritation of her tongue lip with tingling.      No personal  or family history of skin cancer    Patient presents with concerns above.    Patient has been in their usual state of health.     Past notes/ records and appropriate/relevant lab results including pathology and past body maps reviewed. Including outside notes/ PCP notes as appropriate. Updated and new information noted in current visit.     ROS:  Denies other relevant systemic complaints.      History, medications, allergies reviewed as noted.    Allergies:  Levaquin [levofloxacin], Metronidazole, Compazine [prochlorperazine], and Sulfanilamide     Physical Examination:     Well-developed well-nourished patient alert oriented in no acute distress.  Exam performed, including scalp, head, neck, face,nails, hair, external eyes, including conjunctival mucosa, eyelids, lips external ears , arms, digits,palms.     Multiple light to medium brown, well marginated, uniformly pigmented, macules and papules 6 mm and less scattered on exam. pigmented lesions examined with dermoscopy  benign-appearing patterns.     Waxy tannish keratotic papules scattered, cherry-red vascular papules scattered.    See map today's date for lesions noted .  See assessment and plan below for specific lesions.    Otherwise remarkable for lesions as noted on map.    See A/P  below for additional information:    Assessment / plan:    No orders of the defined types were placed in this encounter.      Meds & Refills for this Visit:  Requested Prescriptions     Signed Prescriptions Disp Refills    clobetasol 0.05 % External Cream 60 g 3     Sig: Apply 1 Application topically 2 (two) times daily.    predniSONE 10 MG Oral Tab 30 tablet 0     Sig: Take po 1 tab 4 times daily x 3 days,then 1 tab 3times daily x 3 days, then 1 tab 2 times daily x 3 days, then 1 tab daily x 3 days         Encounter Diagnoses   Name Primary?    SK (seborrheic keratosis) Yes    Lentigo     Multiple nevi     Benign neoplasm of skin, unspecified location     Dermatitis        Patient with numerous bites over the chest arms back small erythematous with puncta with symptoms with suspect spider bite however very busy lesions possible itch mites.  Patient had been outdoors suspect may be more than 1 type of insect.  Lip and tongue symptoms have improved.  Continue prednisone taper, topical steroids discussed.  Local care antihistamines.  Patient does have EpiPen.  Nothing else new or different.  If not improving she will let us know    Overall eruption not consistent with drug reaction at this time.    Lesion on thigh benign keratosis reassurance  Numerous benign keratoses lentigines, nevi no other suspicious lesions    Benign-appearing nevi, no atypical features on dermoscopy reassurance given monitor.     Waxy tan keratotic papules lesions in areas of concern as noted reassurance given.  Benign nature discussed.  Possibility of cryo, alphahydroxy acids over-the-counter retinol's discussed.     No other susupicious lesions on todays  exam.    Please  refer to map for specific lesions.  See additional diagnoses.  Pros cons of various therapies, risks benefits discussed.Pathophysiology discussed with patient.  Therapeutic options reviewed.  See  Medications in grid.  Instructions reviewed at length.    Benign nevi, seborrheic  keratoses, cherry angiomas:  Reassurance regarding other benign skin lesions.Signs and symptoms of skin cancer, ABCDE's of melanoma discussed with patient. Sunscreen (broad-spectrum, ideally mineral-based-UVA/UVB -SPF 30 or higher) use encouraged, sun protection/sun protective clothing, self exams reviewed Followup as noted RTC ---routine checkup    6 mos -one year or p.r.n.    Encounter Times   Including precharting, reviewing chart, prior notes obtaining history: 10 minutes, medical exam :10 minutes, notes on body map, plan, counseling 10minutes My total time spent caring for the patient on the day of the encounter: 30 minutes     The patient indicates understanding of these issues and agrees to the plan.  The patient is asked to return as noted in follow-up/ above.    This note was generated using Dragon voice recognition software.  Please contact me regarding any confusion resulting from errors in recognition..  Note to patient and family: The 21st Century Cures Act makes medical notes like these available to patients. However, be advised this is a medical document. It is intended as [id number] communication and monitoring of a patient's care needs. It is written in medical language and may contain abbreviations or verbiage that are unfamiliar. It may appear blunt or direct. Medical documents are intended to carry relevant information, facts as evident and the clinical opinion of the practitioner.

## 2024-08-29 ENCOUNTER — TELEPHONE (OUTPATIENT)
Dept: FAMILY MEDICINE CLINIC | Facility: CLINIC | Age: 54
End: 2024-08-29

## 2024-08-29 ENCOUNTER — HOSPITAL ENCOUNTER (OUTPATIENT)
Dept: ULTRASOUND IMAGING | Facility: HOSPITAL | Age: 54
Discharge: HOME | End: 2024-08-29
Attending: FAMILY MEDICINE
Payer: MEDICAID

## 2024-08-29 DIAGNOSIS — M53.82 DISORDER OF NECK: ICD-10-CM

## 2024-08-29 DIAGNOSIS — E04.1 THYROID NODULE: Primary | ICD-10-CM

## 2024-08-29 PROCEDURE — 76536 US EXAM OF HEAD AND NECK: CPT | Performed by: FAMILY MEDICINE

## 2024-08-29 NOTE — TELEPHONE ENCOUNTER
Patient returning  call,verified name and date of birth.    Patient returning call from Dr Hernandez about test results, no result note in Epic yet.    Dr Hernandez, you can reach patient now at [phone].

## 2024-08-30 RX ORDER — CLONAZEPAM 0.5 MG/1
0.5 TABLET ORAL 2 TIMES DAILY PRN
Qty: 20 TABLET | Refills: 0 | Status: SHIPPED | OUTPATIENT
Start: 2024-08-30

## 2024-09-06 ENCOUNTER — HOSPITAL ENCOUNTER (OUTPATIENT)
Dept: ULTRASOUND IMAGING | Facility: HOSPITAL | Age: 54
Discharge: HOME | End: 2024-09-06
Attending: FAMILY MEDICINE
Payer: MEDICAID

## 2024-09-06 DIAGNOSIS — E04.1 THYROID NODULE: ICD-10-CM

## 2024-09-06 PROCEDURE — 10005 FNA BX W/US GDN 1ST LES: CPT | Performed by: FAMILY MEDICINE

## 2024-09-06 PROCEDURE — 88173 CYTOPATH EVAL FNA REPORT: CPT | Performed by: FAMILY MEDICINE

## 2024-09-11 ENCOUNTER — TELEPHONE (OUTPATIENT)
Dept: FAMILY MEDICINE CLINIC | Facility: CLINIC | Age: 54
End: 2024-09-11

## 2024-09-11 DIAGNOSIS — E04.1 THYROID NODULE: Primary | ICD-10-CM

## 2024-09-11 NOTE — TELEPHONE ENCOUNTER
Spoke to the patient   Biopsy looks benign   She is to see endocrinologist to discuss further follow ups about this. She agrees

## 2024-09-11 NOTE — TELEPHONE ENCOUNTER
Spoke to patient. She is anxious for her thyroid biopsy results. RN does not see the biopsy report but patient said she received it in [facility] yesterday.     Dr. Hernandez, patient anxious for you to review biopsy results. Please advise.

## 2024-09-30 ENCOUNTER — LAB ENCOUNTER (OUTPATIENT)
Dept: LAB | Facility: HOSPITAL | Age: 54
End: 2024-09-30
Attending: INTERNAL MEDICINE
Payer: MEDICAID

## 2024-09-30 DIAGNOSIS — M81.8 OTHER OSTEOPOROSIS WITHOUT CURRENT PATHOLOGICAL FRACTURE: ICD-10-CM

## 2024-09-30 LAB
ALBUMIN SERPL-MCNC: 4.1 G/DL (ref 3.2–4.8)
ALBUMIN/GLOB SERPL: 1.7 {RATIO} (ref 1–2)
ALP LIVER SERPL-CCNC: 50 U/L
ALT SERPL-CCNC: 9 U/L
ANION GAP SERPL CALC-SCNC: 5 MMOL/L (ref 0–18)
AST SERPL-CCNC: 18 U/L (ref ?–34)
BASOPHILS # BLD AUTO: 0.02 X10(3) UL (ref 0–0.2)
BASOPHILS NFR BLD AUTO: 0.5 %
BILIRUB SERPL-MCNC: 0.9 MG/DL (ref 0.3–1.2)
BUN BLD-MCNC: 14 MG/DL (ref 9–23)
BUN/CREAT SERPL: 17.1 (ref 10–20)
CALCIUM BLD-MCNC: 8.7 MG/DL (ref 8.7–10.4)
CHLORIDE SERPL-SCNC: 110 MMOL/L (ref 98–112)
CO2 SERPL-SCNC: 28 MMOL/L (ref 21–32)
CORTIS SERPL-MCNC: 14.9 UG/DL
CREAT BLD-MCNC: 0.82 MG/DL
DEPRECATED RDW RBC AUTO: 39.5 FL (ref 35.1–46.3)
EGFRCR SERPLBLD CKD-EPI 2021: 84 ML/MIN/1.73M2 (ref 60–?)
EOSINOPHIL # BLD AUTO: 0.04 X10(3) UL (ref 0–0.7)
EOSINOPHIL NFR BLD AUTO: 0.9 %
ERYTHROCYTE [DISTWIDTH] IN BLOOD BY AUTOMATED COUNT: 12.5 % (ref 11–15)
ERYTHROCYTE [SEDIMENTATION RATE] IN BLOOD: 11 MM/HR
FASTING STATUS PATIENT QL REPORTED: YES
GLOBULIN PLAS-MCNC: 2.4 G/DL (ref 2–3.5)
GLUCOSE BLD-MCNC: 93 MG/DL (ref 70–99)
HCT VFR BLD AUTO: 43 %
HGB BLD-MCNC: 14 G/DL
IMM GRANULOCYTES # BLD AUTO: 0.01 X10(3) UL (ref 0–1)
IMM GRANULOCYTES NFR BLD: 0.2 %
LYMPHOCYTES # BLD AUTO: 1.4 X10(3) UL (ref 1–4)
LYMPHOCYTES NFR BLD AUTO: 32 %
MCH RBC QN AUTO: 28.2 PG (ref 26–34)
MCHC RBC AUTO-ENTMCNC: 32.6 G/DL (ref 31–37)
MCV RBC AUTO: 86.7 FL
MONOCYTES # BLD AUTO: 0.29 X10(3) UL (ref 0.1–1)
MONOCYTES NFR BLD AUTO: 6.6 %
NEUTROPHILS # BLD AUTO: 2.61 X10 (3) UL (ref 1.5–7.7)
NEUTROPHILS # BLD AUTO: 2.61 X10(3) UL (ref 1.5–7.7)
NEUTROPHILS NFR BLD AUTO: 59.8 %
OSMOLALITY SERPL CALC.SUM OF ELEC: 296 MOSM/KG (ref 275–295)
PHOSPHATE SERPL-MCNC: 3.3 MG/DL (ref 2.4–5.1)
PLATELET # BLD AUTO: 224 10(3)UL (ref 150–450)
POTASSIUM SERPL-SCNC: 3.9 MMOL/L (ref 3.5–5.1)
PROT SERPL-MCNC: 6.5 G/DL (ref 5.7–8.2)
PTH-INTACT SERPL-MCNC: 69.1 PG/ML (ref 18.5–88)
RBC # BLD AUTO: 4.96 X10(6)UL
SODIUM SERPL-SCNC: 143 MMOL/L (ref 136–145)
T3FREE SERPL-MCNC: 2.33 PG/ML (ref 2.4–4.2)
T4 FREE SERPL-MCNC: 1.1 NG/DL (ref 0.8–1.7)
TSI SER-ACNC: 1.11 MIU/ML (ref 0.55–4.78)
VIT D+METAB SERPL-MCNC: 25.3 NG/ML (ref 30–100)
WBC # BLD AUTO: 4.4 X10(3) UL (ref 4–11)

## 2024-09-30 PROCEDURE — 82533 TOTAL CORTISOL: CPT

## 2024-09-30 PROCEDURE — 84481 FREE ASSAY (FT-3): CPT

## 2024-09-30 PROCEDURE — 84080 ASSAY ALKALINE PHOSPHATASES: CPT

## 2024-09-30 PROCEDURE — 80053 COMPREHEN METABOLIC PANEL: CPT

## 2024-09-30 PROCEDURE — 82330 ASSAY OF CALCIUM: CPT

## 2024-09-30 PROCEDURE — 86364 TISS TRNSGLTMNASE EA IG CLAS: CPT

## 2024-09-30 PROCEDURE — 84439 ASSAY OF FREE THYROXINE: CPT

## 2024-09-30 PROCEDURE — 36415 COLL VENOUS BLD VENIPUNCTURE: CPT

## 2024-09-30 PROCEDURE — 84100 ASSAY OF PHOSPHORUS: CPT

## 2024-09-30 PROCEDURE — 82306 VITAMIN D 25 HYDROXY: CPT

## 2024-09-30 PROCEDURE — 84443 ASSAY THYROID STIM HORMONE: CPT

## 2024-09-30 PROCEDURE — 84165 PROTEIN E-PHORESIS SERUM: CPT

## 2024-09-30 PROCEDURE — 85025 COMPLETE CBC W/AUTO DIFF WBC: CPT

## 2024-09-30 PROCEDURE — 86334 IMMUNOFIX E-PHORESIS SERUM: CPT

## 2024-09-30 PROCEDURE — 83970 ASSAY OF PARATHORMONE: CPT

## 2024-09-30 PROCEDURE — 85652 RBC SED RATE AUTOMATED: CPT

## 2024-09-30 PROCEDURE — 83521 IG LIGHT CHAINS FREE EACH: CPT

## 2024-10-01 ENCOUNTER — OFFICE VISIT (OUTPATIENT)
Dept: PULMONOLOGY | Facility: CLINIC | Age: 54
End: 2024-10-01
Payer: MEDICAID

## 2024-10-01 VITALS
OXYGEN SATURATION: 96 % | SYSTOLIC BLOOD PRESSURE: 98 MMHG | BODY MASS INDEX: 18.78 KG/M2 | HEART RATE: 75 BPM | RESPIRATION RATE: 14 BRPM | HEIGHT: 60 IN | WEIGHT: 95.63 LBS | DIASTOLIC BLOOD PRESSURE: 67 MMHG

## 2024-10-01 DIAGNOSIS — R06.00 DYSPNEA, UNSPECIFIED TYPE: Primary | ICD-10-CM

## 2024-10-01 LAB
ALBUMIN SERPL ELPH-MCNC: 3.89 G/DL (ref 3.75–5.21)
ALBUMIN/GLOB SERPL: 1.69 {RATIO} (ref 1–2)
ALPHA1 GLOB SERPL ELPH-MCNC: 0.24 G/DL (ref 0.19–0.46)
ALPHA2 GLOB SERPL ELPH-MCNC: 0.66 G/DL (ref 0.48–1.05)
B-GLOBULIN SERPL ELPH-MCNC: 0.68 G/DL (ref 0.68–1.23)
GAMMA GLOB SERPL ELPH-MCNC: 0.73 G/DL (ref 0.62–1.7)
KAPPA LC FREE SER-MCNC: 1.6 MG/DL (ref 0.33–1.94)
KAPPA LC FREE/LAMBDA FREE SER NEPH: 1 {RATIO} (ref 0.26–1.65)
LAMBDA LC FREE SERPL-MCNC: 1.59 MG/DL (ref 0.57–2.63)
PROT SERPL-MCNC: 6.2 G/DL (ref 5.7–8.2)
TTG IGA SER-ACNC: <0.2 U/ML (ref ?–7)
TTG IGG SER-ACNC: <0.6 U/ML (ref ?–7)

## 2024-10-01 PROCEDURE — 99204 OFFICE O/P NEW MOD 45 MIN: CPT | Performed by: INTERNAL MEDICINE

## 2024-10-01 NOTE — H&P
Referring Physician  Pia Hernandez MD    Chief Complaint  Cough    History of Present Illness  Patient is a 54-year-old female presents pulmonary clinic for initial visit.  Earlier this year admits to ongoing cough for several months duration.  Had productive cough at times with yellow-green sputum.  Cough improved later in spring and over the last 2 weeks admits to some worsening allergy control.  Has been taking Zyrtec on daily basis.  Denies significant productive cough at this time.  Denies use of inhaler therapy.  Denies history of known lung disease or known asthma.  Does admit to history of migraines.  Some occasional lightheadedness when taking deep breaths.  Patient admits to some mild dyspnea with exertion with decreased exercise tolerance    Review of Systems  Constitutional: denies weight loss, fevers, chills, weakness, fatigue, recent illness  HEENT: denies epistaxis, sore throat, postnasal drip  Cardio: denies chest pain, chest pressure, palpitations  Respiratory: dyspnea, cough, denies wheezing, hemoptysis   GI: denies nausea, vomiting, abdominal pain  : denies dysuria, hematuria  Musculoskeletal: denies arthralgia, myalgia  Integumentary: denies rash, itching  Neurological: History of migraine  Psychiatric: denies depression, anxiety  Hematologic: denies bruising    Past Medical History  Past Medical History:    Acne    Allergic rhinitis    Contact allergic reaction    Endometriosis    Environmental allergies    Headache    Hypoglycemia    Kidney stones    Osteoarthritis    UTI (urinary tract infection)        Surgical History  Past Surgical History:   Procedure Laterality Date    Cholecystectomy      Colonoscopy            Other surgical history      laparscopy for endometriosis x 5    Other surgical history      oral surgery    Tonsillectomy         Family History  Family History   Problem Relation Age of Onset    Diabetes Father     Other (Other) Father         COPD    Dementia Father      Heart Disorder Mother         CAD and Stroke (CVA)    Hypertension Mother     Arthritis Mother     Pulmonary Disease Mother     Cancer Sister         Lymphoma    Macular degeneration Neg     Glaucoma Neg         Social History  Tobacco: Denies  Alcohol: Denies significant intake  Illicit Drugs: Denies    Medications  Current Outpatient Medications on File Prior to Visit   Medication Sig Dispense Refill    clonazePAM 0.5 MG Oral Tab Take 1 tablet (0.5 mg total) by mouth 2 (two) times daily as needed for Anxiety. 20 tablet 0    Galcanezumab-gnlm (EMGALITY) 120 MG/ML Subcutaneous Solution Auto-injector Inject 120 mg into the skin every 30 (thirty) days. 1 each 11    clobetasol 0.05 % External Cream Apply 1 Application topically 2 (two) times daily. 60 g 3    NURTEC 75 MG Oral Tablet Dispersible Take 1 tablet by mouth every other day. 15 tablet 11    progesterone (PROMETRIUM) 100 MG Oral Cap Take 1 capsule (100 mg total) by mouth nightly. 90 capsule 1    JUAN M 0.05 MG/24HR Transdermal Patch Biweekly Place 1 patch onto the skin twice a week. 24 each 1    cetirizine 10 MG Oral Tab Take 1 tablet (10 mg total) by mouth daily.      Ibuprofen (ADVIL OR) Take by mouth.      Melatonin-Pyridoxine (MELATIN OR) Take by mouth.      metroNIDAZOLE 0.75 % Vaginal Gel Place 1 Tube vaginally nightly.       No current facility-administered medications on file prior to visit.       Allergies  Allergies   Allergen Reactions    Levaquin [Levofloxacin] RASH     Pt also reports RACING HEART    Metronidazole HIVES    Compazine [Prochlorperazine]     Sulfanilamide SHORTNESS OF BREATH       Physical Exam  Constitutional: no acute distress  HEENT: PERRL  Neck: supple, no JVD  Cardio: RRR, S1 S2  Respiratory: clear to auscultation bilaterally, no wheezing, rales, rhonchi, crackles  GI: abdomen soft, non tender, active bowel sounds, no organomegaly  Extremities: no clubbing, cyanosis, edema  Neurologic: no gross motor deficits  Skin: warm,  dry  Lymphatic: no supraclavicular lymphadenopathy     Imaging  CT chest on 1/3/2024 with small airway disease seen.    Pulmonary Function Testing  None available to review    Assessment  1.  Dyspnea with exertion  2.  Small airway disease    Plan  -Patient presents today for pulm evaluation.  Admits to some occasional cough which may be secondary to her underlying small airway disease or upper airway cough syndrome.  Advised to continue Zyrtec may try Flonase.  If worsening cough and dyspnea symptoms similar to earlier this year may consider trial of ICS/LABA in future.  Will obtain baseline pulmonary function testing.  Hold off additional chest imaging at this time.    Mendy Farias DO  Pulmonary Critical Care Medicine  [facility]  10/1/2024  1:04 PM

## 2024-10-02 ENCOUNTER — LAB ENCOUNTER (OUTPATIENT)
Dept: LAB | Facility: HOSPITAL | Age: 54
End: 2024-10-02
Attending: INTERNAL MEDICINE
Payer: MEDICAID

## 2024-10-02 DIAGNOSIS — E78.5 HYPERLIPIDEMIA, UNSPECIFIED HYPERLIPIDEMIA TYPE: ICD-10-CM

## 2024-10-02 DIAGNOSIS — M81.8 OTHER OSTEOPOROSIS WITHOUT CURRENT PATHOLOGICAL FRACTURE: ICD-10-CM

## 2024-10-02 DIAGNOSIS — E56.9 HYPOVITAMINOSIS: ICD-10-CM

## 2024-10-02 LAB
ALBUMIN SERPL-MCNC: 4.3 G/DL (ref 3.2–4.8)
ALKALINE PHOSPHATASE BONE SPECIFIC: 9.5 UG/L
ALP LIVER SERPL-CCNC: 56 U/L
ALT SERPL-CCNC: 8 U/L
AST SERPL-CCNC: 16 U/L (ref ?–34)
BILIRUB DIRECT SERPL-MCNC: 0.2 MG/DL (ref ?–0.3)
BILIRUB SERPL-MCNC: 1.1 MG/DL (ref 0.3–1.2)
CALCIUM 24H UR-SRATE: 70 MG/24HR (ref 100–300)
CHOLEST SERPL-MCNC: 238 MG/DL (ref ?–200)
CREAT UR-SCNC: 0.69 G/24 HR (ref 0.6–1.8)
FASTING PATIENT LIPID ANSWER: YES
HDLC SERPL-MCNC: 74 MG/DL (ref 40–59)
LC CALCIUM, IONIZED: 4.9 MG/DL
LDLC SERPL CALC-MCNC: 151 MG/DL (ref ?–100)
NONHDLC SERPL-MCNC: 164 MG/DL (ref ?–130)
PHOSPHATE UR-SCNC: 579.6 MG/24HR (ref 400–1300)
PROT SERPL-MCNC: 6.9 G/DL (ref 5.7–8.2)
SODIUM SERPL-SCNC: 73 MMOL/L/24HR (ref 40–220)
SPECIMEN VOL UR: 1400 ML
TRIGL SERPL-MCNC: 74 MG/DL (ref 30–149)
VIT D+METAB SERPL-MCNC: 30.6 NG/ML (ref 30–100)
VLDLC SERPL CALC-MCNC: 14 MG/DL (ref 0–30)

## 2024-10-02 PROCEDURE — 80076 HEPATIC FUNCTION PANEL: CPT

## 2024-10-02 PROCEDURE — 84300 ASSAY OF URINE SODIUM: CPT

## 2024-10-02 PROCEDURE — 82024 ASSAY OF ACTH: CPT

## 2024-10-02 PROCEDURE — 84105 ASSAY OF URINE PHOSPHORUS: CPT

## 2024-10-02 PROCEDURE — 36415 COLL VENOUS BLD VENIPUNCTURE: CPT

## 2024-10-02 PROCEDURE — 82306 VITAMIN D 25 HYDROXY: CPT

## 2024-10-02 PROCEDURE — 82570 ASSAY OF URINE CREATININE: CPT

## 2024-10-02 PROCEDURE — 82340 ASSAY OF CALCIUM IN URINE: CPT

## 2024-10-02 PROCEDURE — 80061 LIPID PANEL: CPT

## 2024-10-03 LAB — ACTH: 32.7 PG/ML

## 2024-10-09 ENCOUNTER — TELEPHONE (OUTPATIENT)
Dept: NEUROLOGY | Facility: CLINIC | Age: 54
End: 2024-10-09

## 2024-10-09 DIAGNOSIS — G43.719 INTRACTABLE CHRONIC MIGRAINE WITHOUT AURA AND WITHOUT STATUS MIGRAINOSUS: Primary | ICD-10-CM

## 2024-10-09 RX ORDER — ATOGEPANT 30 MG/1
30 TABLET ORAL DAILY
Qty: 30 TABLET | Refills: 5 | Status: SHIPPED | OUTPATIENT
Start: 2024-10-09

## 2024-10-09 NOTE — TELEPHONE ENCOUNTER
Last Botox on 08/08/24. Pt states that she has had 2 rounds of Emgality, and states that she is feeling worse with her migraines, pt sensitive to sunlight, extreme pain, nausea and vomiting, sound sensitive. Pt states that her migraines have been a 10/10 all week. She is wondering what next steps are or should she continue with Botox. Next emgality injection is 11/06/2024.

## 2024-10-09 NOTE — TELEPHONE ENCOUNTER
Approved    Prior authorization approved  Payer: [employer] [facility] Case ID: [id number]    [phone]  Note from payer: Your request was approved based on the initial information provided at the time of the coverage request submission. Please allow additional time for the final decision to be made and added to the patient's account.  Electronic appeal: Not supported

## 2024-10-09 NOTE — TELEPHONE ENCOUNTER
Phone call returned to pt. Advised pt about restarting Botox, pt is agreeable. New referral written for Botox. Advised pt to stop taking Emgality and advised of the new medication Qulipta, already authorized. Pt verbalized appreciation and will schedule her Botox appointment once she is approved.

## 2024-10-09 NOTE — TELEPHONE ENCOUNTER
Pt called in wanted to speak to clinical team regarding emgality. Advised she is on day 5 and has headaches not feeling well. Pt is wondering if okay to continue with Botox pt would be due 11/08. Since last botox visit advised pt will getting on emgality and were stopping botox.

## 2024-10-09 NOTE — TELEPHONE ENCOUNTER
I will suggest to continue with Botox, since she was getting at least some benefit with that.  She should stop Emgality if it causes so much problem.  We can try Qulipta next.

## 2024-10-10 ENCOUNTER — MED REC SCAN ONLY (OUTPATIENT)
Dept: NEUROLOGY | Facility: CLINIC | Age: 54
End: 2024-10-10

## 2024-10-14 ENCOUNTER — TELEPHONE (OUTPATIENT)
Dept: PHYSICAL MEDICINE AND REHAB | Facility: CLINIC | Age: 54
End: 2024-10-14

## 2024-10-14 NOTE — TELEPHONE ENCOUNTER
Insurance confirmed active    Insurance: [employer] Community Member ID# [id number]   Medication: Botox 200 units  Number of visits Approved: 4  (PLEASE INFORM THE PATIENT TO CONTACT THE OFFICE IF THE INSURANCE CHANGES WITHIN THE YEAR AS HE/SHE WILL BE RESPONSIBLE TO UPDATE THE OFFICE IF INSURANCE CHANGES SO THAT PROCEDURE IS BILLED CORRECTLY) this will be 3 of 4  Dx: G43.719  Last Botox done: 8/8/24  Next Botox due around: 11/8/24  Authorization # [id number]  Valid 3/25/24-5/1/24  [employer]

## 2024-10-18 ENCOUNTER — HOSPITAL ENCOUNTER (OUTPATIENT)
Dept: RESPIRATORY THERAPY | Facility: HOSPITAL | Age: 54
Discharge: HOME | End: 2024-10-18
Attending: INTERNAL MEDICINE
Payer: MEDICAID

## 2024-10-18 DIAGNOSIS — R06.00 DYSPNEA, UNSPECIFIED TYPE: ICD-10-CM

## 2024-10-18 PROCEDURE — 94060 EVALUATION OF WHEEZING: CPT | Performed by: INTERNAL MEDICINE

## 2024-10-18 PROCEDURE — 94729 DIFFUSING CAPACITY: CPT | Performed by: INTERNAL MEDICINE

## 2024-10-18 PROCEDURE — 94726 PLETHYSMOGRAPHY LUNG VOLUMES: CPT | Performed by: INTERNAL MEDICINE

## 2024-10-18 NOTE — PROCEDURES
[facility]  part of [facility]     Pulmonary Function Test     Chanel Segura Patient Status:  Outpatient    1970 MRN [id number]   Date of Exam 10/18/2024 PCP Pia Hernandez MD           Spirometry   FEV1: 1.90 90%  FVC: 2.50 96%  FEV1/FVC: 0.76    Lung Volume   T.26 96%  RV : 1.69 118%    Diffusion Capacity   DLCO: 16.68 94%    Flow Volume Loop       Impression   No evidence of obstructive defect seen without significant postbronchodilator response observed.  Normal lung volumes.  Normal diffusion capacity.    Mendy Farias DO  Pulmonary Critical Care Medicine  [facility]

## 2024-10-22 ENCOUNTER — TELEPHONE (OUTPATIENT)
Dept: ENDOCRINOLOGY CLINIC | Facility: CLINIC | Age: 54
End: 2024-10-22

## 2024-11-11 ENCOUNTER — TELEPHONE (OUTPATIENT)
Dept: PULMONOLOGY | Facility: CLINIC | Age: 54
End: 2024-11-11

## 2024-11-11 NOTE — TELEPHONE ENCOUNTER
----- Message from Mendy Farias sent at 11/8/2024  3:52 PM CST -----  You may let the patient know that reviewed PFT results with no significant abnormalities.  With that being said if patient still has some ongoing symptoms let me know if she wants to try a trial of inhaler therapy for 1 month duration and see how she responds.

## 2024-11-11 NOTE — TELEPHONE ENCOUNTER
Discussed results and recommendations with patient. Patient verbalized understanding  Patient interested in trying maintenance inhaler    Dr Farias- please prescribe

## 2024-11-12 RX ORDER — FLUTICASONE FUROATE AND VILANTEROL 100; 25 UG/1; UG/1
1 POWDER RESPIRATORY (INHALATION) DAILY
Qty: 1 EACH | Refills: 5 | Status: SHIPPED | OUTPATIENT
Start: 2024-11-12

## 2024-11-12 NOTE — TELEPHONE ENCOUNTER
You may let the patient know that I placed prescription for Breo.  If Breo is not covered may try other equivalent inhaler.  Patient should update us in 1 month and see if it helped with her symptoms

## 2024-11-18 ENCOUNTER — TELEPHONE (OUTPATIENT)
Dept: PULMONOLOGY | Facility: CLINIC | Age: 54
End: 2024-11-18

## 2024-11-18 DIAGNOSIS — J98.4 SMALL AIRWAYS DISEASE: Primary | ICD-10-CM

## 2024-11-18 NOTE — TELEPHONE ENCOUNTER
Medication Quantity Refills Start End   fluticasone furoate-vilanterol (BREO ELLIPTA) 100-25 MCG/ACT Inhalation Aerosol Powder, Breath Activated 1 each 5 11/12/2024 --   Sig:   Inhale 1 puff into the lungs daily. Rinse your mouth with water without swallowing after using BREO to help reduce your chance of getting thrush.     PA NEEDED

## 2024-11-20 RX ORDER — FLUTICASONE PROPIONATE AND SALMETEROL 250; 50 UG/1; UG/1
1 POWDER RESPIRATORY (INHALATION) 2 TIMES DAILY
Qty: 1 EACH | Refills: 5 | Status: SHIPPED | OUTPATIENT
Start: 2024-11-20

## 2024-11-25 ENCOUNTER — TELEPHONE (OUTPATIENT)
Dept: NEUROLOGY | Facility: CLINIC | Age: 54
End: 2024-11-25

## 2024-11-25 DIAGNOSIS — G43.009 MIGRAINE WITHOUT AURA AND WITHOUT STATUS MIGRAINOSUS, NOT INTRACTABLE: ICD-10-CM

## 2024-11-25 NOTE — TELEPHONE ENCOUNTER
Pt called in advised she needs refill on NURTEC 75 MG Oral Tablet Dispersible . Confirmed [id number] - [place], [address] [phone], [phone] . Pls advise.

## 2024-11-26 RX ORDER — RIMEGEPANT SULFATE 75 MG/75MG
1 TABLET, ORALLY DISINTEGRATING ORAL EVERY OTHER DAY
Qty: 15 TABLET | Refills: 0 | Status: SHIPPED | OUTPATIENT
Start: 2024-11-26

## 2024-11-26 NOTE — TELEPHONE ENCOUNTER
Medication: NURTEC 75 MG Oral Tablet Dispersible      Date of last refill: 05/08/24 (#15/11)  [place]      Last office visit: 08/23/24 (non-Botox)  Due back to clinic per last office note:  annual  Date next office visit scheduled:    Future Appointments   Date Time Provider Department Center   11/27/2024 10:00 AM Shahzad Robles MD [facility] [place] [facility]   12/27/2024  1:30 PM [facility]           Last OV note recommendation:    Assessment  1. Postconcussion syndrome  Patient with possible postconcussion syndrome, continues to have cognitive problems.        MRI of the brain was not revealing.  Continuation of posttraumatic migrainous headaches.  Again we had long discussion about preventive treatments and avoidance of analgesic overuse.    She has tried amitriptyline, she has tried nortriptyline, she has tried Nurtec with no significant benefit.  Except possibly milder headaches with Nurtec.  She was interested in trying different options and she was considering Botox versus CGRP blocker injections.  In the end she chose to be on Botox.  We will try to apply with her insurance     2. Cerebral cavernoma

## 2024-11-27 ENCOUNTER — OFFICE VISIT (OUTPATIENT)
Dept: NEUROLOGY | Facility: CLINIC | Age: 54
End: 2024-11-27
Payer: MEDICAID

## 2024-11-27 DIAGNOSIS — G43.719 INTRACTABLE CHRONIC MIGRAINE WITHOUT AURA AND WITHOUT STATUS MIGRAINOSUS: Primary | ICD-10-CM

## 2024-11-27 NOTE — PROCEDURES
PROCEDURE: Botox Injections (MIGRAINE PROTOCOL)   PRE-OPERATIVE DIAGNOSIS: Chronic Migraine  POST-OPERATIVE DIAGNOSIS: same as above   BLOOD LOSS: minimal COMPLICATIONS: none     DESCRIPTION OF PROCEDURE: After a discussion of the risks and benefits, the patient consented to the procedure. The patient was taken to the procedure room. The upper back, neck, and occipital area was prepped with alcohol swabs. The 2 Botox vials containing 100 units each, were reconstituted with saline.     There are 31 distinct targets in the standard protocol.    Following muscles and units were injected:     and procerus muscles were skipped this time, instead frontalis muscles were injected at the 4 sites.  40 units.    Temporalis was skipped.    Occipitalis 40 units divided between 8 sites.    Cervical paraspinal were skipped.    Trapezius was skipped.    Patient tolerated the procedure well.    Total of 80 Units of botulinum toxin were used and 20 Units were wasted.    Lesser dose of Botox worked well, she denied discomfort and trouble with chewing and neck achiness.  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  Therefore Emgality will be tried.  Will also try Cefaly device

## 2024-12-30 DIAGNOSIS — G43.009 MIGRAINE WITHOUT AURA AND WITHOUT STATUS MIGRAINOSUS, NOT INTRACTABLE: ICD-10-CM

## 2024-12-30 RX ORDER — RIMEGEPANT SULFATE 75 MG/75MG
1 TABLET, ORALLY DISINTEGRATING ORAL EVERY OTHER DAY
Qty: 15 TABLET | Refills: 0 | Status: SHIPPED | OUTPATIENT
Start: 2024-12-30

## 2024-12-30 NOTE — TELEPHONE ENCOUNTER
The patient is requesting a refill on: : Nurtec Odt 75 Mg Tab Pfiz   NURTEC 75 MG Oral Tablet Dispersible 15 tablet 0 11/26/2024 --   Sig:   Take 1 tablet by mouth every other day.     Route:   Oral       [place]26/24 (#15/0)    Last OV: Botox 11/27/24  Next OV: none  Future Appointments   Date Time Provider Department Center   1/27/2025 10:30 AM [facility]

## 2025-01-20 ENCOUNTER — TELEPHONE (OUTPATIENT)
Dept: PHYSICAL MEDICINE AND REHAB | Facility: CLINIC | Age: 55
End: 2025-01-20

## 2025-01-20 NOTE — TELEPHONE ENCOUNTER
Insurance reviewed and verified as active.    PLEASE NOTE 100 UNITS TO BE USED, BUT PATIENT IS APPROVED .  FUTURE ORDER WILL BE PLACED  IF THE  PREFERS TO PROCEED.     Insurance: [employer] Community Member ID# [id number]   Medication: Botox 200 units  Number of visits Approved: 4  (PLEASE INFORM THE PATIENT TO CONTACT THE OFFICE IF THE INSURANCE CHANGES WITHIN THE YEAR AS HE/SHE WILL BE RESPONSIBLE TO UPDATE THE OFFICE IF INSURANCE CHANGES SO THAT PROCEDURE IS BILLED CORRECTLY) this will be 4 of 4  Dx: G43.719  Last Botox done: 11/27/24  Next Botox due around: 2/28/25 - Scheduled  Authorization # [id number]  Valid 3/25/24-5/1/24  [employer]BILL

## 2025-01-27 ENCOUNTER — OFFICE VISIT (OUTPATIENT)
Dept: ENDOCRINOLOGY CLINIC | Facility: CLINIC | Age: 55
End: 2025-01-27

## 2025-01-27 VITALS
HEIGHT: 60 IN | SYSTOLIC BLOOD PRESSURE: 95 MMHG | HEART RATE: 70 BPM | BODY MASS INDEX: 19.91 KG/M2 | DIASTOLIC BLOOD PRESSURE: 64 MMHG | WEIGHT: 101.38 LBS

## 2025-01-27 DIAGNOSIS — M81.8 OTHER OSTEOPOROSIS WITHOUT CURRENT PATHOLOGICAL FRACTURE: ICD-10-CM

## 2025-01-27 DIAGNOSIS — Z78.0 POST-MENOPAUSAL: Primary | ICD-10-CM

## 2025-01-27 DIAGNOSIS — R82.998: ICD-10-CM

## 2025-01-27 DIAGNOSIS — M25.50 ARTHRALGIA, UNSPECIFIED JOINT: ICD-10-CM

## 2025-01-27 DIAGNOSIS — E04.2 MULTINODULAR GOITER: ICD-10-CM

## 2025-01-27 PROCEDURE — 99214 OFFICE O/P EST MOD 30 MIN: CPT | Performed by: INTERNAL MEDICINE

## 2025-01-27 NOTE — PROGRESS NOTES
Follow-up- Reason for Visit:  Evaluation of BMD.  Requesting Physician: Dr. Hernandez.    CHIEF COMPLAINT:    Chief Complaint   Patient presents with    Osteoporosis     Follow up test results        HISTORY OF PRESENT ILLNESS:   Chanel Segura is a 55 year old female who presents with osteopenia. At the last visit, I had wanted to perform a complete evaluation into various possible causes for the development osteoporosis. She is here to go over the test results.     FRACTURE HISTORY  o Low trauma, atraumatic or high trauma (specifics regarding circumstances of fracture)- Fell on the ice on her lower back and did not have a fracture. She has severe joint pain. She has a strong family history of rheumatoid arthritis and testing for this has come back negative.     o Fall-related fracture and circumstances of fall    o Prior history of fracture(s)     o Site, age at fracture, interventions- rib fracture;     o Prior history of osteoporosis    o Prior history of osteoporosis treatment (medication, age or date used, duration of use, pre or postmenopausal ,when used, and reason for discontinuation) None    o Prolonged history of steroids, aromatase inhibitors, anticonvulsants, and other meds that may affect skeleton    o Chronic use vs. intermittent use with dates of usage    o Secondary conditions associated with osteoporosis- she has a history of kidney stones, and cut out dairy    o DXA tests in past ( age or date when performed and results) 5/14/24  PROCEDURE: XR DEXA BONE DENSITOMETRY (CPT=77080)     COMPARISON: [facility], XR DEXA BONE DENSITOMETRY (CPT=77080), 5/10/2019, 11:04 AM.     INDICATIONS: Menopause     TECHNIQUE: Measurement of bone mineral density of the lumbar spine and hip was performed on a Hologic dual energy x-ray absorptiometry scanner.     FINDINGS:     LEFT FEMORAL NECK  BMD: 0.585 gm/sq. cm. T SCORE: -2.4 Z SCORE: -1.3     LEFT TOTAL HIP  BMD: 0.702 gm/sq. cm. T SCORE: -2.0 Z  SCORE: -1.2     PA LUMBAR SPINE (L1 - L4)  BMD: 0.824 gm/sq. cm. T SCORE: -2.0 Z SCORE: -0.9        T scores are a comparison to sex-matched patients with mean peak bone mass and are given in standard deviation (s.d.).  Each 1 s.d. corresponds to approximately 10% below peak normal bone density.       WORLD HEALTH ORGANIZATION CRITERIA  NORMAL T SCORE: Above -1 s.d.    OSTEOPENIA T SCORE: Between -1 and -2.5 s.d.    OSTEOPOROSIS T SCORE: -2.5 s.d.     National Osteoporosis Foundation Clinician's Guide to Prevention and Treatment of Osteoporosis recommendations for treatment:  Post menopausal women and men age 50 and older presenting with the following should be considered for treatment:  * A hip or vertebral (clinical or morphometric) fracture  * T score < -2.5 at the femoral neck or spine after appropriate evaluation to exclude secondary causes.  * Low bone mass (T score between -1.0 and -2.5 at the femoral neck or spine) and a 10-year probability of a hip fracture > 3% or a 10-year probability of a major osteoporosis-related fracture > 20% based on the [place]-adapted WHO algorithm              Impression  CONCLUSION:  1. Findings in the left femoral neck suggest osteopenia, there may be increased fracture risk.  There has been increase in the BMD by 2.6% from previous study.  Findings in the total left hip suggest osteopenia, and there may be increased fracture risk.   There has been decrease in the BMD by 0.5% from previous study.  The 10 year fracture risk for major osteoporotic fracture is 26%, and for hip fracture is 2.9%.  2. Findings in the total AP lumbar spine suggest osteopenia, and there may be increased fracture risk.  There has been increase in the BMD by 1.7% from previous study.           Dictated by (CST): Richard Blum MD on 5/14/2024 at 5:39 PM      Finalized by (CST): Richard Blum MD on 5/14/2024 at 5:40 PM          REPRODUCTIVE HISTORY      o Menarche age   10  o Regular/Irregular menses   10  o  History of amenorrhea  When she was on Lupron  o G P A L   (1 miscarriage)  o Breastfeeding  4 weeks  o Contraceptives? Infertility?  none  o Natural menopause age   45   o Hormone therapy (start and stop)  Started when she was 48 to 49  o Concurrent diseases and medications    PAST MEDICAL HISTORY:   Past Medical History:    Acne    Allergic rhinitis    Contact allergic reaction    Endometriosis    Environmental allergies    Headache    Hypoglycemia    Kidney stones    Osteoarthritis    UTI (urinary tract infection)       SURGICAL HISTORY  Past Surgical History:   Procedure Laterality Date    Cholecystectomy      Colonoscopy            Other surgical history      laparscopy for endometriosis x 5    Other surgical history      oral surgery    Tonsillectomy         SOCIAL HISTORY  o Daily calcium intake (food and supplements)  Yes, takes vitamin D, but no calcium due to history of kidney stones  o Daily exercise  Exercise  FAMILY HISTORY   Family History   Problem Relation Age of Onset    Diabetes Father     Other (Other) Father         COPD    Dementia Father     Heart Disorder Mother         CAD and Stroke (CVA)    Hypertension Mother     Arthritis Mother     Pulmonary Disease Mother     Cancer Sister         Lymphoma    Macular degeneration Neg     Glaucoma Neg      Family history of fractures, osteoporosis, osteopenia      CURRENT MEDICATIONS:    Current Outpatient Medications   Medication Sig Dispense Refill    NURTEC 75 MG Oral Tablet Dispersible Take 1 tablet by mouth every other day. 15 tablet 0    fluticasone-salmeterol 250-50 MCG/ACT Inhalation Aerosol Powder, Breath Activated Inhale 1 puff into the lungs 2 (two) times daily. inhale 1 puff by INHALATION route 2 times every day morning and evening approximately 12 hours apart 1 each 5    Atogepant (QULIPTA) 30 MG Oral Tab Take 30 mg by mouth daily. 30 tablet 5    clonazePAM 0.5 MG Oral Tab Take 1 tablet (0.5 mg total) by mouth 2 (two) times daily as  needed for Anxiety. 20 tablet 0    Galcanezumab-gnlm (EMGALITY) 120 MG/ML Subcutaneous Solution Auto-injector Inject 120 mg into the skin every 30 (thirty) days. 1 each 11    clobetasol 0.05 % External Cream Apply 1 Application topically 2 (two) times daily. 60 g 3    metroNIDAZOLE 0.75 % Vaginal Gel Place 1 Tube vaginally nightly.      progesterone (PROMETRIUM) 100 MG Oral Cap Take 1 capsule (100 mg total) by mouth nightly. 90 capsule 1    JUAN M 0.05 MG/24HR Transdermal Patch Biweekly Place 1 patch onto the skin twice a week. 24 each 1    cetirizine 10 MG Oral Tab Take 1 tablet (10 mg total) by mouth daily.      Ibuprofen (ADVIL OR) Take by mouth.      Melatonin-Pyridoxine (MELATIN OR) Take by mouth.         ALLERGIES:  Allergies   Allergen Reactions    Levaquin [Levofloxacin] RASH     Pt also reports RACING HEART    Metronidazole HIVES    Compazine [Prochlorperazine]     Sulfanilamide SHORTNESS OF BREATH         ASSESSMENTS:   PAIN ASSESSMENT: (Patient reports pain) yes    FALL ASSESSMENT: she is stable    FUNCTIONAL ASSESSMENT (Able to perform all ADLs):  yes    REVIEW OF SYSTEMS:  No falls in past 12 months, no loss of height   Constitutional: Negative for: weight change, fever, fatigue, cold/heat intolerance  Eyes: Negative for:  Visual changes, proptosis, blurring  ENT: Negative for:  dysphagia, neck swelling, dysphonia  Respiratory: Negative for:  dyspnea, cough  Cardiovascular: Negative for:  chest pain, palpitations, orthopnea  GI: Negative for:  abdominal pain, nausea, vomiting, diarrhea, constipation, bleeding  Neurology: Negative for: headache, numbness, weakness  Genito-Urinary: Negative for: dysuria, frequency  Psychiatric: Negative for:  depression, anxiety  Hematology/Lymphatics: Negative for: bruising, lower extremity edema  Endocrine: Negative for: polyuria, polydypsia  Skin: Negative for: rash, blister, cellulitis,      PHYSICAL EXAM:   Vitals:    01/27/25 1048   BP: 95/64   Pulse: 70   Weight:  101 lb 6.4 oz (46 kg)   Height: 5' (1.524 m)     BMI: Body mass index is 19.8 kg/m².     CONSTITUTIONAL:  awake, alert, cooperative, no apparent distress, and appears stated age  PSYCH: normal affect  ABDOMEN:  normal bowel sounds, soft, non-distended, non-tender  SKIN:  no bruising or bleeding, no rashes and no lesions  EXTREMITIES:normal pulses, no edema      DATA:   CMP:    Lab Results   Component Value Date     09/30/2024    K 3.9 09/30/2024     09/30/2024    CO2 28.0 09/30/2024    BUN 14 09/30/2024    GLU 93 09/30/2024    ALB 4.3 10/02/2024    CA 8.7 09/30/2024     FLP (Lipid Profile):    Lab Results   Component Value Date    TRIG 74 10/02/2024    HDL 74 (H) 10/02/2024     LDL direct : No components found for: \"LDLDIRECT\"  Microalbumin/Creatinine ratio:  No components found for: \"RUCREAT\", \"RUMICROAL\", \"MCRATIO\"  TSH:    Lab Results   Component Value Date    TSH 1.110 09/30/2024         ASSESSMENT AND PLAN: This is a 55 year-old woman here for follow-up of management of osteoporosis that had been identified first in 2019 and has improved slightly since then, presumably due to the use of HRT. I would like to increase the dose of her estrogen patch.     We will refer her to rheumatology, as well as Rehab Medicine. She will increase her dietary calcium, as well as vitamin D dose.     She will also return to clinic in 3 months with lab work. She will have a repeat thyroid US in September 2025.     Prior to this encounter, I spent over 15 minutes with preparing for the visit, including reviewing documents from other specialties as well as from PCP and going over test results and imaging studies. During the face to face encounter, I spent an additional 15 minutes which were determined for follow-up. Greater than 50% of the time was spent in counseling, anticipatory guidance, and coordination of care. Patient concerns were answered to the best of my knowledge.       1/28/25  eTre Ballesteros MD   detailed exam PERRL/EOMI/conjunctiva clear

## 2025-02-02 DIAGNOSIS — G43.009 MIGRAINE WITHOUT AURA AND WITHOUT STATUS MIGRAINOSUS, NOT INTRACTABLE: ICD-10-CM

## 2025-02-03 DIAGNOSIS — E04.1 THYROID NODULE: ICD-10-CM

## 2025-02-03 RX ORDER — RIMEGEPANT SULFATE 75 MG/75MG
1 TABLET, ORALLY DISINTEGRATING ORAL EVERY OTHER DAY
Qty: 15 TABLET | Refills: 0 | Status: SHIPPED | OUTPATIENT
Start: 2025-02-03

## 2025-02-03 NOTE — TELEPHONE ENCOUNTER
Requested Prescriptions     Pending Prescriptions Disp Refills    NURTEC 75 MG Oral Tablet Dispersible [Pharmacy Med Name: Nurtec Odt 75 Mg Tab Pfiz] 15 tablet 0     Sig: Take 1 tablet by mouth every other day.       Last OV: 11/27/2024-BOTOX  Next OV: 2/28/2025    [place]/;  Rimegepant Sulfate     Dispensed Written Strength Quantity Refills Days Supply Provider Pharmacy   NURTEC ODT 75 MG TAB BIO 12/30/2024 12/30/2024 75 15 each  30 Shahzad Robles MD [id number] [place]   NURTEC ODT 75 MG TAB BIOH 11/26/2024 11/26/2024 75 15 each  30 Shahzad Robles MD [id number] [place]       Last office visit plan;   PROCEDURE: Botox Injections (MIGRAINE PROTOCOL)   PRE-OPERATIVE DIAGNOSIS: Chronic Migraine  POST-OPERATIVE DIAGNOSIS: same as above   BLOOD LOSS: minimal COMPLICATIONS: none      DESCRIPTION OF PROCEDURE: After a discussion of the risks and benefits, the patient consented to the procedure. The patient was taken to the procedure room. The upper back, neck, and occipital area was prepped with alcohol swabs. The 2 Botox vials containing 100 units each, were reconstituted with saline.      There are 31 distinct targets in the standard protocol.     Following muscles and units were injected:      and procerus muscles were skipped this time, instead frontalis muscles were injected at the 4 sites.  40 units.     Temporalis was skipped.     Occipitalis 40 units divided between 8 sites.     Cervical paraspinal were skipped.     Trapezius was skipped.     Patient tolerated the procedure well.     Total of 80 Units of botulinum toxin were used and 20 Units were wasted.     Lesser dose of Botox worked well, she denied discomfort and trouble with chewing and neck achiness.  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  Therefore Emgality will be tried.  Will also try Cefaly device

## 2025-02-07 RX ORDER — CLONAZEPAM 0.5 MG/1
0.5 TABLET ORAL 2 TIMES DAILY PRN
Qty: 20 TABLET | Refills: 0 | Status: SHIPPED | OUTPATIENT
Start: 2025-02-07

## 2025-02-07 NOTE — TELEPHONE ENCOUNTER
Review pended refill request as it does not fall under a protocol.    Last Rx: 8/30/24  LOV: 5/21/24

## 2025-02-10 PROBLEM — R82.998: Status: ACTIVE | Noted: 2025-02-10

## 2025-02-10 PROBLEM — E04.2 MULTINODULAR GOITER: Status: ACTIVE | Noted: 2025-02-10

## 2025-02-10 PROBLEM — Z78.0 POST-MENOPAUSAL: Status: ACTIVE | Noted: 2025-02-10

## 2025-02-10 RX ORDER — ESTRADIOL 0.07 MG/D
1 FILM, EXTENDED RELEASE TRANSDERMAL
Qty: 24 PATCH | Refills: 3 | Status: SHIPPED | OUTPATIENT
Start: 2025-02-10

## 2025-02-28 ENCOUNTER — OFFICE VISIT (OUTPATIENT)
Dept: NEUROLOGY | Facility: CLINIC | Age: 55
End: 2025-02-28
Payer: MEDICAID

## 2025-02-28 ENCOUNTER — MED REC SCAN ONLY (OUTPATIENT)
Dept: NEUROLOGY | Facility: CLINIC | Age: 55
End: 2025-02-28

## 2025-02-28 DIAGNOSIS — G43.719 INTRACTABLE CHRONIC MIGRAINE WITHOUT AURA AND WITHOUT STATUS MIGRAINOSUS: Primary | ICD-10-CM

## 2025-02-28 DIAGNOSIS — F07.81 POSTCONCUSSION SYNDROME: ICD-10-CM

## 2025-02-28 PROCEDURE — 64615 CHEMODENERV MUSC MIGRAINE: CPT | Performed by: OTHER

## 2025-02-28 NOTE — PROCEDURES
PROCEDURE: Botox Injections (MIGRAINE PROTOCOL)   PRE-OPERATIVE DIAGNOSIS: Chronic Migraine  POST-OPERATIVE DIAGNOSIS: same as above   BLOOD LOSS: minimal COMPLICATIONS: none     DESCRIPTION OF PROCEDURE: After a discussion of the risks and benefits, the patient consented to the procedure. The patient was taken to the procedure room. The upper back, neck, and occipital area was prepped with alcohol swabs. The 2 Botox vials containing 100 units each, were reconstituted with saline.     There are 31 distinct targets in the standard protocol.    Following muscles and units were injected:     and procerus muscles were done this time at the patient request, additionally frontalis muscles were injected at the 8 sites.  50 units.    Temporalis was skipped.    Occipitalis 50 units divided between 10 sites.    Cervical paraspinal were skipped.    Trapezius was skipped.    Patient tolerated the procedure well.    Total of 100 Units of botulinum toxin were used and 0 Units were wasted.    Lesser dose of Botox worked well, she denied discomfort and trouble with chewing and neck achiness.  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  Therefore Emgality was tried. Not helpful. Then we tried Qulipta and that was somewhat helpful, but caused constipation, however patient did not want to stop using it.

## 2025-03-31 ENCOUNTER — TELEPHONE (OUTPATIENT)
Dept: PHYSICAL MEDICINE AND REHAB | Facility: CLINIC | Age: 55
End: 2025-03-31

## 2025-03-31 NOTE — TELEPHONE ENCOUNTER
Attempted to initiate the prior auth for Botox 100 units, but the patient insurance is not active at this time.  [email or web address] message has been sent to the patient asking her to upload her current insurance information.

## 2025-04-21 ENCOUNTER — TELEPHONE (OUTPATIENT)
Dept: ENDOCRINOLOGY CLINIC | Facility: CLINIC | Age: 55
End: 2025-04-21

## 2025-04-21 DIAGNOSIS — E04.2 MULTINODULAR GOITER: Primary | ICD-10-CM

## 2025-05-08 ENCOUNTER — NURSE TRIAGE (OUTPATIENT)
Dept: FAMILY MEDICINE CLINIC | Facility: CLINIC | Age: 55
End: 2025-05-08

## 2025-05-08 NOTE — TELEPHONE ENCOUNTER
Please reply to pool: EM RN TRIAGE  Action Requested: Summary for Provider     []  Critical Lab, Recommendations Needed  [] Need Additional Advice  [x]   FYI    []   Need Orders  [] Need Medications Sent to Pharmacy  []  Other     SUMMARY: Patient contacts clinic reporting chronic cough.  Has been see previously and also consulted with pulmonologist.  She reports cough up small, tan balls of \"tissue\" which has also occurred previously.  It does not look like phlegm.  This happens intermittently.  Las cough material was pink tinged.  She gest short of breath with exertion.  Denies fever or chest pain. Requesting visit with Dr. Hernandez or Dr. Hawthorne.  Visit scheduled 05/19 per patient request.  Patient advised to monitor symptoms and report any progression prior.  Emergency room for worsening shortness of breath.  She verbalized understanding and compliance.     Reason for call: Cough  Onset: Data Unavailable                         Reason for Disposition   Cough has been present for > 3 weeks    Protocols used: Cough-A-OH

## 2025-05-19 ENCOUNTER — OFFICE VISIT (OUTPATIENT)
Dept: FAMILY MEDICINE CLINIC | Facility: CLINIC | Age: 55
End: 2025-05-19

## 2025-05-19 VITALS
HEART RATE: 72 BPM | DIASTOLIC BLOOD PRESSURE: 64 MMHG | TEMPERATURE: 98 F | BODY MASS INDEX: 19.07 KG/M2 | RESPIRATION RATE: 18 BRPM | HEIGHT: 60 IN | SYSTOLIC BLOOD PRESSURE: 96 MMHG | WEIGHT: 97.13 LBS

## 2025-05-19 DIAGNOSIS — R05.3 CHRONIC COUGH: ICD-10-CM

## 2025-05-19 DIAGNOSIS — M25.542 ARTHRALGIA OF BOTH HANDS: Primary | ICD-10-CM

## 2025-05-19 DIAGNOSIS — M25.541 ARTHRALGIA OF BOTH HANDS: Primary | ICD-10-CM

## 2025-05-19 DIAGNOSIS — E04.1 THYROID NODULE: ICD-10-CM

## 2025-05-19 PROCEDURE — 99213 OFFICE O/P EST LOW 20 MIN: CPT | Performed by: FAMILY MEDICINE

## 2025-05-19 NOTE — PROGRESS NOTES
Subjective:   Patient ID: Chanel Segura is a 55 year old female.    Pt presents with hx of some recurrent cough and chronic bronchitis. Had work up done with pulmonary last year and had CT scan done which showed some small airway disease. Pt did have an episode of coughing up mucous and some blood tinged sputum. No further episodes and no coughing now. No fevers or chest pains.     Pt also has had hx of arthralgias mostly of hands and more of right hand. Pt has not had an opportunity to see rheumatology for this as recommended by her usual provider.   Pt also has been seeing endocrine for thyroid nodule. Had FNA in Sept of last year and has been trying to follow up with Dr. Ballesteros for this. Was told that she is on leave at the moment.        History/Other:   Review of Systems   Constitutional:  Negative for fever.   Respiratory:  Positive for cough. Negative for shortness of breath.    Cardiovascular:  Negative for chest pain and palpitations.   Gastrointestinal:  Negative for abdominal pain.   Musculoskeletal:  Positive for arthralgias.     Current Medications[1]  Allergies:Allergies[2]    Objective:   Physical Exam  Vitals reviewed.   Constitutional:       Appearance: Normal appearance. She is well-developed.   HENT:      Right Ear: Tympanic membrane and ear canal normal.      Left Ear: Tympanic membrane and ear canal normal.      Mouth/Throat:      Mouth: Mucous membranes are moist.      Pharynx: No oropharyngeal exudate or posterior oropharyngeal erythema.   Cardiovascular:      Rate and Rhythm: Normal rate and regular rhythm.      Pulses: Normal pulses.      Heart sounds: Normal heart sounds.   Pulmonary:      Effort: Pulmonary effort is normal. No respiratory distress.      Breath sounds: Normal breath sounds. No stridor. No wheezing or rales.   Musculoskeletal:      Cervical back: Normal range of motion and neck supple.   Lymphadenopathy:      Cervical: No cervical adenopathy.   Neurological:      Mental  Status: She is alert.         Assessment & Plan:   1. Arthralgia of both hands:  - After discussion, to follow up with rheumatology for further evaluation and treatment; To call if any significant symptoms.      2. Chronic cough: no symptoms now: reviewed previous work up with pulmononary  - After discussion with patient, to monitor for symptoms and call if any significant symptoms; to follow up with pulmonary if any sig symptoms for further evaluation/ ER if any sig symptoms.     3. Thyroid nodule:  \- After discussion, to follow up with endocrine for follow up as planned; can see another provider in Dr. Ballesteros's office if she is out. To call if any significant symptoms.          No orders of the defined types were placed in this encounter.      Meds This Visit:  Requested Prescriptions      No prescriptions requested or ordered in this encounter       Imaging & Referrals:  RHEUMATOLOGY - INTERNAL         [1]   Current Outpatient Medications   Medication Sig Dispense Refill    estradiol (JUAN M) 0.075 MG/24HR Transdermal Patch Biweekly Place 1 patch onto the skin twice a week. 24 patch 3    clonazePAM 0.5 MG Oral Tab Take 1 tablet (0.5 mg total) by mouth 2 (two) times daily as needed for Anxiety. 20 tablet 0    NURTEC 75 MG Oral Tablet Dispersible Take 1 tablet by mouth every other day. 15 tablet 0    fluticasone-salmeterol 250-50 MCG/ACT Inhalation Aerosol Powder, Breath Activated Inhale 1 puff into the lungs 2 (two) times daily. inhale 1 puff by INHALATION route 2 times every day morning and evening approximately 12 hours apart 1 each 5    Atogepant (QULIPTA) 30 MG Oral Tab Take 30 mg by mouth daily. 30 tablet 5    progesterone (PROMETRIUM) 100 MG Oral Cap Take 1 capsule (100 mg total) by mouth nightly. 90 capsule 1    JUAN M 0.05 MG/24HR Transdermal Patch Biweekly Place 1 patch onto the skin twice a week. 24 each 1    cetirizine 10 MG Oral Tab Take 1 tablet (10 mg total) by mouth daily.      Ibuprofen (ADVIL  OR) Take by mouth.      Melatonin-Pyridoxine (MELATIN OR) Take by mouth.      Galcanezumab-gnlm (EMGALITY) 120 MG/ML Subcutaneous Solution Auto-injector Inject 120 mg into the skin every 30 (thirty) days. 1 each 11    clobetasol 0.05 % External Cream Apply 1 Application topically 2 (two) times daily. 60 g 3    metroNIDAZOLE 0.75 % Vaginal Gel Place 1 Tube vaginally nightly.     [2]   Allergies  Allergen Reactions    Levaquin [Levofloxacin] RASH     Pt also reports RACING HEART    Metronidazole HIVES    Compazine [Prochlorperazine]     Sulfanilamide SHORTNESS OF BREATH

## 2025-05-25 ENCOUNTER — PATIENT MESSAGE (OUTPATIENT)
Dept: FAMILY MEDICINE CLINIC | Facility: CLINIC | Age: 55
End: 2025-05-25

## 2025-06-02 NOTE — TELEPHONE ENCOUNTER
Dr. Ballesteros,  Pt would like to be seen ASAP d/t previous thyroid nodule biopsy (benign). Repeat thyroid US sked for 6/13. Please advise.    Pt had thyroid biopsy in August 2024. Pt had f/u appt sked with Dr. Ballesteros, but it had to be resked since Dr. Ballesteros was on leave. Pt was offered a f/u appt with Dr. Cesar, but she doesn't want to wait that long. Pt says she is supposed to have her biopsy repeated in August, so she needs to be seen before the earliest available appt in September. Pt has repeat thyroid US sked for 6/13. Pt is very anxious and would like to see Dr. Lesli VILLASEÑOR. Pt willing to go to [place] or [facility] office. Pt cannot have her phone on at work, so RN told pt once appt is sked, [facility]. Pt verbalized understanding.

## 2025-06-03 NOTE — TELEPHONE ENCOUNTER
Hi!  Please let patient know that the FNA result of her thyroid nodule was benign. She will need a repeat thyroid US in September 2025. She will not need another FNA (biopsy) unless the nodule in question has grown more than 20%.     Please ask patient what she is concerned about. Then we can see when to schedule her. Thank you!

## 2025-06-09 ENCOUNTER — NURSE TRIAGE (OUTPATIENT)
Dept: FAMILY MEDICINE CLINIC | Facility: CLINIC | Age: 55
End: 2025-06-09

## 2025-06-09 RX ORDER — FLUCONAZOLE 150 MG/1
150 TABLET ORAL ONCE
Qty: 1 TABLET | Refills: 0 | Status: SHIPPED | OUTPATIENT
Start: 2025-06-09 | End: 2025-06-09

## 2025-06-09 NOTE — TELEPHONE ENCOUNTER
Spoke with patient and provider message given.  Patient verbalizes understanding and agrees to plan

## 2025-06-09 NOTE — TELEPHONE ENCOUNTER
Action Requested: Summary for Provider     []  Critical Lab, Recommendations Needed  [] Need Additional Advice  []   FYI    []   Need Orders  [x] Need Medications Sent to Pharmacy  []  Other     SUMMARY: Patient stated that for the past 2 weeks she has been having white thick vaginal discharge with vaginal itching. Vaginal area is irritated from the itching. No vaginal odor. Took monistat over the counter but only helped for a few days.  No other symptoms. Patient wanted to get diflucan prescribed. Dr Hernandez not in the office today. Patient wanted message sent to covering provider.  Please advise.     Reason for call: Vaginal Discharge (White vaginal discharge, vaginal itching)  Onset: May 26, 2025  Reason for Disposition   Symptoms of a yeast infection' (i.e., itchy, white discharge, not bad smelling) and not improved > 3 days following Care Advice    Protocols used: Vaginal Omwzaelbe-O-PN

## 2025-07-07 ENCOUNTER — TELEPHONE (OUTPATIENT)
Dept: FAMILY MEDICINE CLINIC | Facility: CLINIC | Age: 55
End: 2025-07-07

## 2025-08-13 ENCOUNTER — APPOINTMENT (OUTPATIENT)
Dept: CT IMAGING | Facility: HOSPITAL | Age: 55
End: 2025-08-13
Attending: EMERGENCY MEDICINE

## 2025-08-13 ENCOUNTER — HOSPITAL ENCOUNTER (EMERGENCY)
Facility: HOSPITAL | Age: 55
Discharge: HOME | End: 2025-08-14
Attending: EMERGENCY MEDICINE

## 2025-08-13 DIAGNOSIS — R51.9 ACUTE NONINTRACTABLE HEADACHE, UNSPECIFIED HEADACHE TYPE: ICD-10-CM

## 2025-08-13 DIAGNOSIS — M54.50 ACUTE BILATERAL LOW BACK PAIN WITHOUT SCIATICA: Primary | ICD-10-CM

## 2025-08-13 DIAGNOSIS — N94.89 PELVIC CONGESTION: ICD-10-CM

## 2025-08-13 LAB
ALBUMIN SERPL-MCNC: 4.6 G/DL (ref 3.2–4.8)
ALBUMIN/GLOB SERPL: 1.9 (ref 1–2)
ALP LIVER SERPL-CCNC: 56 U/L (ref 46–118)
ALT SERPL-CCNC: 8 U/L (ref 10–49)
ANION GAP SERPL CALC-SCNC: 8 MMOL/L (ref 0–18)
AST SERPL-CCNC: 16 U/L (ref ?–34)
BASOPHILS # BLD AUTO: 0.02 X10(3) UL (ref 0–0.2)
BASOPHILS NFR BLD AUTO: 0.3 %
BILIRUB SERPL-MCNC: 0.6 MG/DL (ref 0.3–1.2)
BILIRUB UR QL: NEGATIVE
BUN BLD-MCNC: 19 MG/DL (ref 9–23)
BUN/CREAT SERPL: 18.6 (ref 10–20)
CALCIUM BLD-MCNC: 9.7 MG/DL (ref 8.7–10.4)
CHLORIDE SERPL-SCNC: 101 MMOL/L (ref 98–112)
CLARITY UR: CLEAR
CO2 SERPL-SCNC: 32 MMOL/L (ref 21–32)
COLOR UR: COLORLESS
CREAT BLD-MCNC: 1.02 MG/DL (ref 0.55–1.02)
DEPRECATED RDW RBC AUTO: 40.7 FL (ref 35.1–46.3)
EGFRCR SERPLBLD CKD-EPI 2021: 64 ML/MIN/1.73M2 (ref 60–?)
EOSINOPHIL # BLD AUTO: 0.09 X10(3) UL (ref 0–0.7)
EOSINOPHIL NFR BLD AUTO: 1.5 %
ERYTHROCYTE [DISTWIDTH] IN BLOOD BY AUTOMATED COUNT: 12.6 % (ref 11–15)
GLOBULIN PLAS-MCNC: 2.4 G/DL (ref 2–3.5)
GLUCOSE BLD-MCNC: 98 MG/DL (ref 70–99)
GLUCOSE UR-MCNC: NORMAL MG/DL
HCT VFR BLD AUTO: 44.1 % (ref 35–48)
HGB BLD-MCNC: 14.5 G/DL (ref 12–16)
HGB UR QL STRIP.AUTO: NEGATIVE
IMM GRANULOCYTES # BLD AUTO: 0.01 X10(3) UL (ref 0–1)
IMM GRANULOCYTES NFR BLD: 0.2 %
KETONES UR-MCNC: NEGATIVE MG/DL
LEUKOCYTE ESTERASE UR QL STRIP.AUTO: NEGATIVE
LYMPHOCYTES # BLD AUTO: 1.57 X10(3) UL (ref 1–4)
LYMPHOCYTES NFR BLD AUTO: 25.4 %
MCH RBC QN AUTO: 29 PG (ref 26–34)
MCHC RBC AUTO-ENTMCNC: 32.9 G/DL (ref 31–37)
MCV RBC AUTO: 88.2 FL (ref 80–100)
MONOCYTES # BLD AUTO: 0.35 X10(3) UL (ref 0.1–1)
MONOCYTES NFR BLD AUTO: 5.7 %
NEUTROPHILS # BLD AUTO: 4.14 X10 (3) UL (ref 1.5–7.7)
NEUTROPHILS # BLD AUTO: 4.14 X10(3) UL (ref 1.5–7.7)
NEUTROPHILS NFR BLD AUTO: 66.9 %
NITRITE UR QL STRIP.AUTO: NEGATIVE
OSMOLALITY SERPL CALC.SUM OF ELEC: 294 MOSM/KG (ref 275–295)
PH UR: 6.5 (ref 5–8)
PLATELET # BLD AUTO: 240 10(3)UL (ref 150–450)
POTASSIUM SERPL-SCNC: 4.4 MMOL/L (ref 3.5–5.1)
PROT SERPL-MCNC: 7 G/DL (ref 5.7–8.2)
PROT UR-MCNC: NEGATIVE MG/DL
RBC # BLD AUTO: 5 X10(6)UL (ref 3.8–5.3)
SODIUM SERPL-SCNC: 141 MMOL/L (ref 136–145)
SP GR UR STRIP: 1.01 (ref 1–1.03)
UROBILINOGEN UR STRIP-ACNC: NORMAL
WBC # BLD AUTO: 6.2 X10(3) UL (ref 4–11)

## 2025-08-13 PROCEDURE — 93005 ELECTROCARDIOGRAM TRACING: CPT

## 2025-08-13 PROCEDURE — 99284 EMERGENCY DEPT VISIT MOD MDM: CPT

## 2025-08-13 PROCEDURE — 85025 COMPLETE CBC W/AUTO DIFF WBC: CPT | Performed by: EMERGENCY MEDICINE

## 2025-08-13 PROCEDURE — 87637 SARSCOV2&INF A&B&RSV AMP PRB: CPT | Performed by: EMERGENCY MEDICINE

## 2025-08-13 PROCEDURE — 81003 URINALYSIS AUTO W/O SCOPE: CPT | Performed by: EMERGENCY MEDICINE

## 2025-08-13 PROCEDURE — 74177 CT ABD & PELVIS W/CONTRAST: CPT | Performed by: EMERGENCY MEDICINE

## 2025-08-13 PROCEDURE — 96374 THER/PROPH/DIAG INJ IV PUSH: CPT

## 2025-08-13 PROCEDURE — 96361 HYDRATE IV INFUSION ADD-ON: CPT

## 2025-08-13 PROCEDURE — 80053 COMPREHEN METABOLIC PANEL: CPT | Performed by: EMERGENCY MEDICINE

## 2025-08-13 PROCEDURE — 87430 STREP A AG IA: CPT | Performed by: EMERGENCY MEDICINE

## 2025-08-13 PROCEDURE — 93010 ELECTROCARDIOGRAM REPORT: CPT

## 2025-08-13 RX ORDER — KETOROLAC TROMETHAMINE 15 MG/ML
15 INJECTION, SOLUTION INTRAMUSCULAR; INTRAVENOUS ONCE
Status: COMPLETED | OUTPATIENT
Start: 2025-08-13 | End: 2025-08-13

## 2025-08-14 VITALS
TEMPERATURE: 98 F | DIASTOLIC BLOOD PRESSURE: 65 MMHG | RESPIRATION RATE: 14 BRPM | OXYGEN SATURATION: 99 % | HEART RATE: 75 BPM | SYSTOLIC BLOOD PRESSURE: 108 MMHG

## 2025-08-14 LAB
ATRIAL RATE: 65 BPM
BV BACTERIA DNA VAG QL NAA+PROBE: NEGATIVE
C GLABRATA DNA VAG QL NAA+PROBE: NEGATIVE
C KRUSEI DNA VAG QL NAA+PROBE: NEGATIVE
CANDIDA DNA VAG QL NAA+PROBE: NEGATIVE
FLUAV + FLUBV RNA SPEC NAA+PROBE: NEGATIVE
FLUAV + FLUBV RNA SPEC NAA+PROBE: NEGATIVE
P AXIS: 18 DEGREES
P-R INTERVAL: 124 MS
Q-T INTERVAL: 400 MS
QRS DURATION: 70 MS
QTC CALCULATION (BEZET): 416 MS
R AXIS: 78 DEGREES
RSV RNA SPEC NAA+PROBE: NEGATIVE
SARS-COV-2 RNA RESP QL NAA+PROBE: NOT DETECTED
T AXIS: 66 DEGREES
T VAGINALIS DNA VAG QL NAA+PROBE: NEGATIVE
VENTRICULAR RATE: 65 BPM

## 2025-08-14 PROCEDURE — 81514 NFCT DS BV&VAGINITIS DNA ALG: CPT | Performed by: EMERGENCY MEDICINE

## 2025-08-14 PROCEDURE — 96375 TX/PRO/DX INJ NEW DRUG ADDON: CPT

## 2025-08-14 RX ORDER — LIDOCAINE 50 MG/G
1 PATCH TOPICAL EVERY 24 HOURS
Qty: 14 PATCH | Refills: 0 | Status: SHIPPED | OUTPATIENT
Start: 2025-08-14

## 2025-08-14 RX ORDER — CYCLOBENZAPRINE HCL 10 MG
10 TABLET ORAL 2 TIMES DAILY PRN
Qty: 14 TABLET | Refills: 0 | Status: SHIPPED | OUTPATIENT
Start: 2025-08-14 | End: 2025-08-21

## 2025-08-14 RX ORDER — DIPHENHYDRAMINE HYDROCHLORIDE 50 MG/ML
25 INJECTION, SOLUTION INTRAMUSCULAR; INTRAVENOUS ONCE
Status: COMPLETED | OUTPATIENT
Start: 2025-08-14 | End: 2025-08-14

## 2025-08-14 RX ORDER — METOCLOPRAMIDE HYDROCHLORIDE 5 MG/ML
10 INJECTION INTRAMUSCULAR; INTRAVENOUS ONCE
Status: COMPLETED | OUTPATIENT
Start: 2025-08-14 | End: 2025-08-14

## 2025-08-14 RX ORDER — FLUCONAZOLE 150 MG/1
150 TABLET ORAL ONCE AS NEEDED
Qty: 1 TABLET | Refills: 0 | Status: SHIPPED | OUTPATIENT
Start: 2025-08-14 | End: 2025-08-14

## 2025-08-21 ENCOUNTER — OFFICE VISIT (OUTPATIENT)
Dept: FAMILY MEDICINE CLINIC | Facility: CLINIC | Age: 55
End: 2025-08-21

## 2025-08-21 VITALS
HEIGHT: 58.8 IN | RESPIRATION RATE: 16 BRPM | TEMPERATURE: 98 F | WEIGHT: 96 LBS | SYSTOLIC BLOOD PRESSURE: 95 MMHG | HEART RATE: 72 BPM | DIASTOLIC BLOOD PRESSURE: 63 MMHG | BODY MASS INDEX: 19.61 KG/M2

## 2025-08-21 DIAGNOSIS — M54.9 DORSALGIA: ICD-10-CM

## 2025-08-21 DIAGNOSIS — J01.90 ACUTE SINUSITIS, RECURRENCE NOT SPECIFIED, UNSPECIFIED LOCATION: Primary | ICD-10-CM

## 2025-08-21 PROCEDURE — 99213 OFFICE O/P EST LOW 20 MIN: CPT | Performed by: FAMILY MEDICINE

## 2025-08-21 RX ORDER — AZITHROMYCIN 250 MG/1
TABLET, FILM COATED ORAL
Qty: 6 TABLET | Refills: 0 | Status: SHIPPED | OUTPATIENT
Start: 2025-08-21 | End: 2025-08-26

## 2025-08-21 RX ORDER — FLUCONAZOLE 150 MG/1
150 TABLET ORAL DAILY
Qty: 2 TABLET | Refills: 0 | Status: SHIPPED | OUTPATIENT
Start: 2025-08-21

## 2025-08-21 RX ORDER — FLUCONAZOLE 150 MG/1
TABLET ORAL
COMMUNITY
Start: 2025-08-14

## 2025-08-29 ENCOUNTER — OFFICE VISIT (OUTPATIENT)
Age: 55
End: 2025-08-29

## 2025-08-29 VITALS
HEIGHT: 58.8 IN | DIASTOLIC BLOOD PRESSURE: 59 MMHG | BODY MASS INDEX: 19.61 KG/M2 | SYSTOLIC BLOOD PRESSURE: 85 MMHG | WEIGHT: 96 LBS | HEART RATE: 75 BPM

## 2025-08-29 DIAGNOSIS — M79.642 BILATERAL HAND PAIN: Primary | ICD-10-CM

## 2025-08-29 DIAGNOSIS — M25.50 POLYARTHRALGIA: ICD-10-CM

## 2025-08-29 DIAGNOSIS — M54.50 CHRONIC LEFT-SIDED LOW BACK PAIN WITHOUT SCIATICA: ICD-10-CM

## 2025-08-29 DIAGNOSIS — M79.641 BILATERAL HAND PAIN: Primary | ICD-10-CM

## 2025-08-29 DIAGNOSIS — G89.29 CHRONIC LEFT-SIDED LOW BACK PAIN WITHOUT SCIATICA: ICD-10-CM

## 2025-08-29 PROCEDURE — 99204 OFFICE O/P NEW MOD 45 MIN: CPT | Performed by: INTERNAL MEDICINE

## 2025-08-29 RX ORDER — METHYLPREDNISOLONE 4 MG/1
TABLET ORAL
Qty: 1 EACH | Refills: 0 | Status: SHIPPED | OUTPATIENT
Start: 2025-08-29

## 2025-08-31 PROBLEM — M79.642 BILATERAL HAND PAIN: Status: ACTIVE | Noted: 2025-08-31

## 2025-08-31 PROBLEM — M54.50 CHRONIC LEFT-SIDED LOW BACK PAIN WITHOUT SCIATICA: Status: ACTIVE | Noted: 2025-08-31

## 2025-08-31 PROBLEM — G89.29 CHRONIC LEFT-SIDED LOW BACK PAIN WITHOUT SCIATICA: Status: ACTIVE | Noted: 2025-08-31

## 2025-08-31 PROBLEM — M79.641 BILATERAL HAND PAIN: Status: ACTIVE | Noted: 2025-08-31

## (undated) NOTE — LETTER
AUTHORIZATION FOR SURGICAL OPERATION OR OTHER PROCEDURE    1. I hereby authorize Dr. Robles and the [facility] staff assigned to my case to perform the following operation and/or procedure at the [facility]:    _______________________________________________________________________________________________    Botox 200U  _______________________________________________________________________________________________    2.  My physician has explained the nature and purpose of the operation or other procedure, possible alternative methods of treatment, the risks involved, and the possibility of complication to me.  I acknowledge that no guarantee has been made as to the result that may be obtained.  3.  I recognize that, during the course of this operation, or other procedure, unforseen conditions may necessitate additional or different procedure than those listed above.  I, therefore, further authorize and request that the above named physician, his/her physician assistants or designees perform such procedures as are, in his/her professional opinion, necessary and desirable.  4.  Any tissue or organs removed in the operation or other procedure may be disposed of by and at the discretion of the [facility] staff and [facility].  5.  I understand that in the event of a medical emergency, I will be transported by local paramedics to [facility] or other hospital emergency department.  6.  I certify that I have read and fully understand the above consent to operation and/or other procedure.    7.  I acknowledge that my physician has explained sedation/analgesia administration to me including the risks and benefits.  I consent to the administration of sedation/analgesia as may be necessary or desirable in the judgement of my physician.    Witness signature: ___________________________________________________ Date:  ______/______/_____                    Time:  ________ A.M.  P.M.        Patient Name:  Chanel Segura  [id number]       Patient signature:  ___________________________________________________               Statement of Physician  My signature below affirms that prior to the time of the procedure, I have explained to the patient and/or his/her guardian, the risks and benefits involved in the proposed treatment and any reasonable alternative to the proposed treatment.  I have also explained the risks and benefits involved in the refusal of the proposed treatment and have answered the patient's questions.                        Date:  ______/______/_______  Provider                      Signature:  __________________________________________________________       Time:  ___________ ACHASITY    P.MKena

## (undated) NOTE — LETTER
AUTHORIZATION FOR SURGICAL OPERATION OR OTHER PROCEDURE    1. I hereby authorize Dr. Shahzad Robles and the [facility] staff assigned to my case to perform the following operation and/or procedure at the [facility]:  Botox 100 units BUY&BILL *  _______________________________________________________________________________________________    Intractable chronic migraine without aura and without status migrainosus   _______________________________________________________________________________________________    2.  My physician has explained the nature and purpose of the operation or other procedure, possible alternative methods of treatment, the risks involved, and the possibility of complication to me.  I acknowledge that no guarantee has been made as to the result that may be obtained.  3.  I recognize that, during the course of this operation, or other procedure, unforseen conditions may necessitate additional or different procedure than those listed above.  I, therefore, further authorize and request that the above named physician, his/her physician assistants or designees perform such procedures as are, in his/her professional opinion, necessary and desirable.  4.  Any tissue or organs removed in the operation or other procedure may be disposed of by and at the discretion of the [facility] staff and [facility].  5.  I understand that in the event of a medical emergency, I will be transported by local paramedics to [facility] or other hospital emergency department.  6.  I certify that I have read and fully understand the above consent to operation and/or other procedure.    7.  I acknowledge that my physician has explained sedation/analgesia administration to me including the risks and benefits.  I consent to the administration of sedation/analgesia as may be necessary or desirable in the judgement of my physician.    Witness signature:  ___________________________________________________ Date:  ______/______/_____                    Time:  ________ A.M.  P.M.       Patient Name:  Chanel Segura 1/27/1970 [id number]       Patient signature:  ___________________________________________________        Statement of Physician  My signature below affirms that prior to the time of the procedure, I have explained to the patient and/or his/her guardian, the risks and benefits involved in the proposed treatment and any reasonable alternative to the proposed treatment.  I have also explained the risks and benefits involved in the refusal of the proposed treatment and have answered the patient's questions.                        Date:  ______/______/_______  Provider                      Signature:  __________________________________________________________       Time:  ___________ A.M    P.M.

## (undated) NOTE — LETTER
Date: 2024      Patient Name: Chanel Segura      : 1970        Thank you for choosing [facility] as your health care provider. Your physician has deemed the following medical service(s) necessary. However, your insurance plan may not pay for all of your health care and costs and may deny payment for this service. The fact that your insurance plan does not pay for an item or service does not mean you should not receive it. The purpose of this form is to help you make an informed decision about whether or not you want to receive this service(s) that may not be paid for by your insurance plan.    CPT Code Description     Cost     _________ _BOTOX___________________________  $4000_____________      _________ ______________________________ _____________      _________ ______________________________ _____________      I understand that the above mentioned service(s) or supply may not be covered by my insurance company. I agree to be financially responsible for the cost of this service or supply in the event of my insurance denies payment as a non-covered benefit.        ______________________________________________________________________  Signature of Patient or Patient's Representative  Relationship  Date    ______________________________________________________________________  Signature of Witness to signing of form   Printed Name

## (undated) NOTE — LETTER
AUTHORIZATION FOR SURGICAL OPERATION OR OTHER PROCEDURE    1. I hereby authorize Dr. Bernarda Thompson and the [facility] staff assigned to my case to perform the following operation and/or procedure at the [facility]:    [facility] under ultrasound guidance    2.   My p Parent    Responsible person                          []  Spouse  In case of minor or                    [] Other  _____________   Incompetent name:  __________________________________________________                               (please print)      _____

## (undated) NOTE — ED AVS SNAPSHOT
Monalisa Do   MRN: [id number]    Department:  [facility] Emergency Department   Date of Visit:  2/20/2020           Disclosure     Insurance plans vary and the physician(s) referred by the ER may not be covered by your plan.  Please contact y CARE PHYSICIAN AT ONCE OR RETURN IMMEDIATELY TO THE EMERGENCY DEPARTMENT. If you have been prescribed any medication(s), please fill your prescription right away and begin taking the medication(s) as directed.   If you believe that any of the medications

## (undated) NOTE — LETTER
Date: May 8, 2024      Patient Name: Chanel Segura      : 1970        Thank you for choosing [facility] as your health care provider. Your physician has deemed the following medical service(s) necessary. However, your insurance plan may not pay for all of your health care and costs and may deny payment for this service. The fact that your insurance plan does not pay for an item or service does not mean you should not receive it. The purpose of this form is to help you make an informed decision about whether or not you want to receive this service(s) that may not be paid for by your insurance plan.    CPT Code Description     Cost     _________ Botox__________________________  $4000__________      _________ ______________________________ _____________      _________ ______________________________ _____________      I understand that the above mentioned service(s) or supply may not be covered by my insurance company. I agree to be financially responsible for the cost of this service or supply in the event of my insurance denies payment as a non-covered benefit.        ______________________________________________________________________  Signature of Patient or Patient's Representative  Relationship  Date    ______________________________________________________________________  Signature of Witness to signing of form   Printed Name

## (undated) NOTE — ED AVS SNAPSHOT
Nicole Sumner   MRN: [id number]    Department:  [facility] Emergency Department   Date of Visit:  10/7/2019           Disclosure     Insurance plans vary and the physician(s) referred by the ER may not be covered by your plan.  Please contact y CARE PHYSICIAN AT ONCE OR RETURN IMMEDIATELY TO THE EMERGENCY DEPARTMENT. If you have been prescribed any medication(s), please fill your prescription right away and begin taking the medication(s) as directed.   If you believe that any of the medications

## (undated) NOTE — LETTER
AUTHORIZATION FOR SURGICAL OPERATION OR OTHER PROCEDURE    1. I hereby authorize Dr. Lang Mead and the [facility] staff assigned to my case to perform the following operation and/or procedure at the [facility]:    _______________________________________________________________________________________________      BOTOX 200 UNITS  _______________________________________________________________________________________________    2. My physician has explained the nature and purpose of the operation or other procedure, possible alternative methods of treatment, the risks involved, and the possibility of complication to me. I acknowledge that no guarantee has been made as to the result that may be obtained. 3.  I recognize that, during the course of this operation, or other procedure, unforseen conditions may necessitate additional or different procedure than those listed above. I, therefore, further authorize and request that the above named physician, his/her physician assistants or designees perform such procedures as are, in his/her professional opinion, necessary and desirable. 4.  Any tissue or organs removed in the operation or other procedure may be disposed of by and at the discretion of the [facility] staff and [facility]. 5.  I understand that in the event of a medical emergency, I will be transported by local paramedics to [facility] or other [facility] emergency department. 6.  I certify that I have read and fully understand the above consent to operation and/or other procedure. 7.  I acknowledge that my physician has explained sedation/analgesia administration to me including the risks and benefits. I consent to the administration of sedation/analgesia as may be necessary or desirable in the judgement of my physician. Witness signature: ___________________________________________________ Date:  ______/______/_____                    Time:  ________ A. M.  P.M. Chanel Segura  1/27/1970  [id number]         Patient signature:  ___________________________________________________    Statement of Physician  My signature below affirms that prior to the time of the procedure, I have explained to the patient and/or his/her guardian, the risks and benefits involved in the proposed treatment and any reasonable alternative to the proposed treatment. I have also explained the risks and benefits involved in the refusal of the proposed treatment and have answered the patient's questions.                         Date:  ______/______/_______  Provider                      Signature:  __________________________________________________________       Time:  ___________ TREVON REGAN

## (undated) NOTE — LETTER
Date: 2025      Patient Name: Chanel Segura      : 1970        Thank you for choosing [facility] as your health care provider. Your physician has deemed the following medical service(s) necessary. However, your insurance plan may not pay for all of your health care and costs and may deny payment for this service. The fact that your insurance plan does not pay for an item or service does not mean you should not receive it. The purpose of this form is to help you make an informed decision about whether or not you want to receive this service(s) that may not be paid for by your insurance plan.    CPT Code Description     Cost     Botox 100 U      $2,000  _________ ______________________________  _____________      _________ ______________________________ _____________      _________ ______________________________ _____________      I understand that the above mentioned service(s) or supply may not be covered by my insurance company. I agree to be financially responsible for the cost of this service or supply in the event of my insurance denies payment as a non-covered benefit.        ______________________________________________________________________  Signature of Patient or Patient's Representative  Relationship  Date    ______________________________________________________________________  Signature of Witness to signing of form   Printed Name

## (undated) NOTE — LETTER
7/24/2024          Chanel Segura        [id number] [id number]         Dear Chanel,    This letter is to inform you that our office has made several attempts to reach you by phone without success.  We were attempting to contact you by phone regarding prescription request    Please contact our office at the number listed below as soon as you receive this letter to discuss this issue and to make the necessary changes in our system to your contact information.  Thank you for your cooperation.        Sincerely,    Pia Hernandez MD  [address] [place], [place] [id number] [phone]        Document electronically generated by:  Ghazal MURRAY RN

## (undated) NOTE — LETTER
AUTHORIZATION FOR SURGICAL OPERATION OR OTHER PROCEDURE    1. I hereby authorize Dr. YANEZ and the [facility] staff assigned to my case to perform the following operation and/or procedure at the [facility]:    _______________________________________________________________________________________________    BOTOX 200U  _______________________________________________________________________________________________    2.  My physician has explained the nature and purpose of the operation or other procedure, possible alternative methods of treatment, the risks involved, and the possibility of complication to me.  I acknowledge that no guarantee has been made as to the result that may be obtained.  3.  I recognize that, during the course of this operation, or other procedure, unforseen conditions may necessitate additional or different procedure than those listed above.  I, therefore, further authorize and request that the above named physician, his/her physician assistants or designees perform such procedures as are, in his/her professional opinion, necessary and desirable.  4.  Any tissue or organs removed in the operation or other procedure may be disposed of by and at the discretion of the [facility] staff and [facility].  5.  I understand that in the event of a medical emergency, I will be transported by local paramedics to [facility] or other hospital emergency department.  6.  I certify that I have read and fully understand the above consent to operation and/or other procedure.    7.  I acknowledge that my physician has explained sedation/analgesia administration to me including the risks and benefits.  I consent to the administration of sedation/analgesia as may be necessary or desirable in the judgement of my physician.    Witness signature: ___________________________________________________ Date:  ______/______/_____                    Time:  ________ A.M.  P.M.        Patient Name:  __Amy L Pedicini  ____________________________________________________  (please print)       Patient signature:  ___________________________________________________             Relationship to Patient:           []  Parent    Responsible person                          []  Spouse  In case of minor or                    [] Other  _____________   Incompetent name:  __________________________________________________                               (please print)      _____________      Responsible person  In case of minor or  Incompetent signature:  _______________________________________________    Statement of Physician  My signature below affirms that prior to the time of the procedure, I have explained to the patient and/or his/her guardian, the risks and benefits involved in the proposed treatment and any reasonable alternative to the proposed treatment.  I have also explained the risks and benefits involved in the refusal of the proposed treatment and have answered the patient's questions.                        Date:  ______/______/_______  Provider                      Signature:  __________________________________________________________       Time:  ___________ A.M    P.M.

## (undated) NOTE — LETTER
Date: 2024      Patient Name: Chanel Segura      : 1970        Thank you for choosing [facility] as your health care provider. Your physician has deemed the following medical service(s) necessary. However, your insurance plan may not pay for all of your health care and costs and may deny payment for this service. The fact that your insurance plan does not pay for an item or service does not mean you should not receive it. The purpose of this form is to help you make an informed decision about whether or not you want to receive this service(s) that may not be paid for by your insurance plan.    CPT Code Description     Cost     Botox 200 U     $4,000  _________ ______________________________  _____________      _________ ______________________________ _____________      _________ ______________________________ _____________      I understand that the above mentioned service(s) or supply may not be covered by my insurance company. I agree to be financially responsible for the cost of this service or supply in the event of my insurance denies payment as a non-covered benefit.    (PLEASE INFORM THE PATIENT TO CONTACT THE OFFICE IF THE INSURANCE CHANGES WITHIN THE YEAR AS HE/SHE WILL BE RESPONSIBLE TO UPDATE THE OFFICE IF INSURANCE CHANGES SO THAT PROCEDURE IS BILLED CORRECTLY) this will be 3 of 4       ______________________________________________________________________  Signature of Patient or Patient's Representative  Relationship  Date      ______________________________________________________________________  Signature of Witness to signing of form   Printed Name

## (undated) NOTE — LETTER
AUTHORIZATION FOR SURGICAL OPERATION OR OTHER PROCEDURE    1. I hereby authorize Dr. Shahzad Robles and the [facility] staff assigned to my case to perform the following operation and/or procedure at the [facility]:    _______________________________________________________________________________________________    Botox 200 units   _______________________________________________________________________________________________    2.  My physician has explained the nature and purpose of the operation or other procedure, possible alternative methods of treatment, the risks involved, and the possibility of complication to me.  I acknowledge that no guarantee has been made as to the result that may be obtained.  3.  I recognize that, during the course of this operation, or other procedure, unforseen conditions may necessitate additional or different procedure than those listed above.  I, therefore, further authorize and request that the above named physician, his/her physician assistants or designees perform such procedures as are, in his/her professional opinion, necessary and desirable.  4.  Any tissue or organs removed in the operation or other procedure may be disposed of by and at the discretion of the [facility] staff and [facility].  5.  I understand that in the event of a medical emergency, I will be transported by local paramedics to [facility] or other hospital emergency department.  6.  I certify that I have read and fully understand the above consent to operation and/or other procedure.    7.  I acknowledge that my physician has explained sedation/analgesia administration to me including the risks and benefits.  I consent to the administration of sedation/analgesia as may be necessary or desirable in the judgement of my physician.        Witness signature: ___________________________________________________ Date:  ______/______/_____                    Time:  ________  HOMERO REGAN     Patient Name:  _____Chanel Segura     [id number]      1/27/1970         Patient signature:  ___________________________________________________        Statement of Physician  My signature below affirms that prior to the time of the procedure, I have explained to the patient and/or his/her guardian, the risks and benefits involved in the proposed treatment and any reasonable alternative to the proposed treatment.  I have also explained the risks and benefits involved in the refusal of the proposed treatment and have answered the patient's questions.                            Date:  ______/______/_______  Provider                      Signature:  __________________________________________________________       Time:  ___________ TREVON REGAN

## (undated) NOTE — LETTER
Date: 2023      Patient Name: Aris Medina      : 1970        Thank you for choosing [facility] as your health care provider. Your physician has deemed the following medical service(s) necessary. However, your insurance plan may not pay for all of your health care and costs and may deny payment for this service. The fact that your insurance plan does not pay for an item or service does not mean you should not receive it. The purpose of this form is to help you make an informed decision about whether or not you want to receive this service(s) that may not be paid for by your insurance plan. CPT Code Description     Cost     _________ BOTOX 200 UNITS     $4000      _________ ______________________________ _____________      _________ ______________________________ _____________      I understand that the above mentioned service(s) or supply may not be covered by my insurance company.  I agree to be financially responsible for the cost of this service or supply in the event of my insurance denies payment as a non-covered benefit.        ______________________________________________________________________  Signature of Patient or Patient's Representative  Relationship  Date    ______________________________________________________________________  Signature of Witness to signing of form   Printed Name

## (undated) NOTE — LETTER
Dear Ms. Segura,    We are sorry that you missed your appointment with Tere Ballesteros MD on 10/22/2024 at 2:45pm.       We have attempted to contact you as your health and follow-up medical care are important to us.      Please call our office at [phone] as soon as possible to reschedule your appointment.  If you have already rescheduled the appointment, please disregard this letter.    Thank you.      Sincerely,         Medical staff for   Tere Ballesteros MD

## (undated) NOTE — LETTER
AUTHORIZATION FOR SURGICAL OPERATION OR OTHER PROCEDURE    1. I hereby authorize Dr. 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  My physician has explained the nature and purpose of the operation or other procedure, possible alternative methods of treatment, the risks involved, and the possibility of complication to me.  I acknowledge that no guarantee has been made as to the result that may be obtained.  3.  I recognize that, during the course of this operation, or other procedure, unforseen conditions may necessitate additional or different procedure than those listed above.  I, therefore, further authorize and request that the above named physician, his/her physician assistants or designees perform such procedures as are, in his/her professional opinion, necessary and desirable.  4.  Any tissue or organs removed in the operation or other procedure may be disposed of by and at the discretion of the [facility] staff and [facility].  5.  I understand that in the event of a medical emergency, I will be transported by local paramedics to [facility] or other hospital emergency department.  6.  I certify that I have read and fully understand the above consent to operation and/or other procedure.    7.  I acknowledge that my physician has explained sedation/analgesia administration to me including the risks and benefits.  I consent to the administration of sedation/analgesia as may be necessary or desirable in the judgement of my physician.    Witness signature:  ___________________________________________________ Date:  ______/______/_____                    Time:  ________ A.M.  P.M.       Patient Name:  Chanel Segura  1/27/1970  [id number]     Patient signature:  ___________________________________________________               Statement of Physician  My signature below affirms that prior to the time of the procedure, I have explained to the patient and/or his/her guardian, the risks and benefits involved in the proposed treatment and any reasonable alternative to the proposed treatment.  I have also explained the risks and benefits involved in the refusal of the proposed treatment and have answered the patient's questions.                        Date:  ______/______/_______  Provider                      Signature:  __________________________________________________________       Time:  ___________ A.M    P.M.

## (undated) NOTE — LETTER
AUTHORIZATION FOR SURGICAL OPERATION OR OTHER PROCEDURE    1.  I hereby authorize Dr. Leo Mejia and the [facility] staff assigned to my case to perform the following operation and/or procedure at the [facility]:    ______________________________________________ ___________________________________________________      Statement of Physician  My signature below affirms that prior to the time of the procedure, I have explained to the patient and/or his/her guardian, the risks and benefits involved in the proposed tr

## (undated) NOTE — LETTER
Date & Time: 2/20/2020, 9:10 PM  Patient: [facility] [facility]  Encounter Provider(s):    On File, NARDA WILKERSON Attending  BART Jones       To Whom It May Concern:    Rajesh Soriano was seen and treated in our department on 2/20/2020.  She can return to work

## (undated) NOTE — LETTER
Date: 2024      Patient Name: Chanel Segura       : 1970   [id number]          Thank you for choosing [facility] as your health care provider. Your physician has deemed the following medical service(s) necessary. However, your insurance plan may not pay for all of your health care and costs and may deny payment for this service. The fact that your insurance plan does not pay for an item or service does not mean you should not receive it. The purpose of this form is to help you make an informed decision about whether or not you want to receive this service(s) that may not be paid for by your insurance plan.    CPT Code Description     Cost     _________ _______Botox 200 units ____________  ___$4000____      _________ ______________________________ _____________      _________ ______________________________ _____________      I understand that the above mentioned service(s) or supply may not be covered by my insurance company. I agree to be financially responsible for the cost of this service or supply in the event of my insurance denies payment as a non-covered benefit.        ______________________________________________________________________  Signature of Patient or Patient's Representative  Relationship  Date        ______________________________________________________________________  Signature of Witness to signing of form   Printed Name